# Patient Record
Sex: MALE | Race: WHITE | NOT HISPANIC OR LATINO | Employment: FULL TIME | URBAN - METROPOLITAN AREA
[De-identification: names, ages, dates, MRNs, and addresses within clinical notes are randomized per-mention and may not be internally consistent; named-entity substitution may affect disease eponyms.]

---

## 2017-10-22 ENCOUNTER — HOSPITAL ENCOUNTER (INPATIENT)
Facility: HOSPITAL | Age: 37
LOS: 1 days | Discharge: HOME | DRG: 983 | End: 2017-10-25
Attending: FAMILY MEDICINE | Admitting: ORTHOPAEDIC SURGERY
Payer: MEDICAID

## 2017-10-22 DIAGNOSIS — L03.011 CELLULITIS OF FINGER OF RIGHT HAND: ICD-10-CM

## 2017-10-22 DIAGNOSIS — A49.01 STAPH AUREUS INFECTION: ICD-10-CM

## 2017-10-22 DIAGNOSIS — Z01.811 PRE-OP CHEST EXAM: ICD-10-CM

## 2017-10-22 DIAGNOSIS — L03.019 CELLULITIS OF FINGER, UNSPECIFIED LATERALITY: Primary | ICD-10-CM

## 2017-10-22 LAB
ANION GAP SERPL CALC-SCNC: 9 MMOL/L
BASOPHILS # BLD AUTO: 0.03 K/UL
BASOPHILS NFR BLD: 0.2 %
BUN SERPL-MCNC: 20 MG/DL
CALCIUM SERPL-MCNC: 9.4 MG/DL
CHLORIDE SERPL-SCNC: 104 MMOL/L
CO2 SERPL-SCNC: 28 MMOL/L
CREAT SERPL-MCNC: 1.3 MG/DL
CRP SERPL-MCNC: 43.4 MG/L
DIFFERENTIAL METHOD: ABNORMAL
EOSINOPHIL # BLD AUTO: 0.1 K/UL
EOSINOPHIL NFR BLD: 0.7 %
ERYTHROCYTE [DISTWIDTH] IN BLOOD BY AUTOMATED COUNT: 13.4 %
ERYTHROCYTE [SEDIMENTATION RATE] IN BLOOD BY WESTERGREN METHOD: 12 MM/HR
EST. GFR  (AFRICAN AMERICAN): >60 ML/MIN/1.73 M^2
EST. GFR  (NON AFRICAN AMERICAN): >60 ML/MIN/1.73 M^2
GLUCOSE SERPL-MCNC: 75 MG/DL
HCT VFR BLD AUTO: 40.1 %
HGB BLD-MCNC: 14 G/DL
IMM GRANULOCYTES # BLD AUTO: 0.05 K/UL
IMM GRANULOCYTES NFR BLD AUTO: 0.4 %
LACTATE SERPL-SCNC: 1.5 MMOL/L
LYMPHOCYTES # BLD AUTO: 1.1 K/UL
LYMPHOCYTES NFR BLD: 9.2 %
MCH RBC QN AUTO: 31.3 PG
MCHC RBC AUTO-ENTMCNC: 34.9 G/DL
MCV RBC AUTO: 90 FL
MONOCYTES # BLD AUTO: 0.7 K/UL
MONOCYTES NFR BLD: 5.9 %
NEUTROPHILS # BLD AUTO: 10.2 K/UL
NEUTROPHILS NFR BLD: 83.6 %
NRBC BLD-RTO: 0 /100 WBC
PLATELET # BLD AUTO: 159 K/UL
PMV BLD AUTO: 9.4 FL
POTASSIUM SERPL-SCNC: 4.1 MMOL/L
RBC # BLD AUTO: 4.47 M/UL
SODIUM SERPL-SCNC: 141 MMOL/L
WBC # BLD AUTO: 12.19 K/UL

## 2017-10-22 PROCEDURE — 80048 BASIC METABOLIC PNL TOTAL CA: CPT

## 2017-10-22 PROCEDURE — 63600175 PHARM REV CODE 636 W HCPCS: Performed by: STUDENT IN AN ORGANIZED HEALTH CARE EDUCATION/TRAINING PROGRAM

## 2017-10-22 PROCEDURE — 25000003 PHARM REV CODE 250: Performed by: FAMILY MEDICINE

## 2017-10-22 PROCEDURE — 85651 RBC SED RATE NONAUTOMATED: CPT

## 2017-10-22 PROCEDURE — 11000001 HC ACUTE MED/SURG PRIVATE ROOM

## 2017-10-22 PROCEDURE — 85025 COMPLETE CBC W/AUTO DIFF WBC: CPT

## 2017-10-22 PROCEDURE — 99284 EMERGENCY DEPT VISIT MOD MDM: CPT | Mod: ,,, | Performed by: EMERGENCY MEDICINE

## 2017-10-22 PROCEDURE — 90471 IMMUNIZATION ADMIN: CPT | Performed by: FAMILY MEDICINE

## 2017-10-22 PROCEDURE — 96365 THER/PROPH/DIAG IV INF INIT: CPT

## 2017-10-22 PROCEDURE — 99285 EMERGENCY DEPT VISIT HI MDM: CPT | Mod: 25

## 2017-10-22 PROCEDURE — 63600175 PHARM REV CODE 636 W HCPCS: Performed by: FAMILY MEDICINE

## 2017-10-22 PROCEDURE — 96367 TX/PROPH/DG ADDL SEQ IV INF: CPT

## 2017-10-22 PROCEDURE — 83605 ASSAY OF LACTIC ACID: CPT

## 2017-10-22 PROCEDURE — 96372 THER/PROPH/DIAG INJ SC/IM: CPT

## 2017-10-22 PROCEDURE — 86140 C-REACTIVE PROTEIN: CPT

## 2017-10-22 PROCEDURE — 87040 BLOOD CULTURE FOR BACTERIA: CPT

## 2017-10-22 PROCEDURE — 90715 TDAP VACCINE 7 YRS/> IM: CPT | Performed by: FAMILY MEDICINE

## 2017-10-22 PROCEDURE — 93005 ELECTROCARDIOGRAM TRACING: CPT

## 2017-10-22 RX ORDER — KETOROLAC TROMETHAMINE 30 MG/ML
15 INJECTION, SOLUTION INTRAMUSCULAR; INTRAVENOUS
Status: COMPLETED | OUTPATIENT
Start: 2017-10-22 | End: 2017-10-22

## 2017-10-22 RX ORDER — SULFAMETHOXAZOLE AND TRIMETHOPRIM 800; 160 MG/1; MG/1
1 TABLET ORAL
Status: ON HOLD | COMMUNITY
End: 2017-10-25

## 2017-10-22 RX ORDER — IBUPROFEN 800 MG/1
800 TABLET ORAL EVERY 12 HOURS PRN
COMMUNITY
End: 2017-11-30

## 2017-10-22 RX ORDER — TRAMADOL HYDROCHLORIDE 50 MG/1
50 TABLET ORAL EVERY 6 HOURS PRN
Status: ON HOLD | COMMUNITY
End: 2017-10-25 | Stop reason: HOSPADM

## 2017-10-22 RX ORDER — LIDOCAINE HYDROCHLORIDE 10 MG/ML
10 INJECTION INFILTRATION; PERINEURAL ONCE
Status: COMPLETED | OUTPATIENT
Start: 2017-10-22 | End: 2017-10-22

## 2017-10-22 RX ADMIN — CLOSTRIDIUM TETANI TOXOID ANTIGEN (FORMALDEHYDE INACTIVATED), CORYNEBACTERIUM DIPHTHERIAE TOXOID ANTIGEN (FORMALDEHYDE INACTIVATED), BORDETELLA PERTUSSIS TOXOID ANTIGEN (GLUTARALDEHYDE INACTIVATED), BORDETELLA PERTUSSIS FILAMENTOUS HEMAGGLUTININ ANTIGEN (FORMALDEHYDE INACTIVATED), BORDETELLA PERTUSSIS PERTACTIN ANTIGEN, AND BORDETELLA PERTUSSIS FIMBRIAE 2/3 ANTIGEN 0.5 ML: 5; 2; 2.5; 5; 3; 5 INJECTION, SUSPENSION INTRAMUSCULAR at 04:10

## 2017-10-22 RX ADMIN — KETOROLAC TROMETHAMINE 15 MG: 30 INJECTION, SOLUTION INTRAMUSCULAR at 06:10

## 2017-10-22 RX ADMIN — LIDOCAINE HYDROCHLORIDE 10 ML: 10 INJECTION, SOLUTION INFILTRATION; PERINEURAL at 05:10

## 2017-10-22 NOTE — ED TRIAGE NOTES
Redness, pain and swelling to his right hand since yesterday.  States that he think something bit him while he was cleaning out a shed.  [facility] and was prescribed Bactrim, Ibuprofen and Ultram.    GENERAL: The patient is well-developed and well-nourished in no apparent distress. Alert and oriented x4.                                                HEENT: Head is normocephalic and atraumatic. Extraocular muscles are intact. Pupils are equal, round, and reactive to light and accommodation. Nares appeared normal. Mouth is well hydrated and without lesions. Mucous membranes are moist. Posterior pharynx clear of any exudate or lesions.    NECK: Supple. No carotid bruits. No lymphadenopathy or thyromegaly.    LUNGS: Clear to auscultation.    HEART: Regular rate and rhythm without murmur.     ABDOMEN: Soft, nontender, and nondistended. Positive bowel sounds. No hepatosplenomegaly was noted.     EXTREMITIES: Without any cyanosis, clubbing, rash.  Has a lesion to his right middle finger and edema in his right hand.     NEUROLOGIC: Cranial nerves II through XII are grossly intact.     PSYCHIATRIC: Flat affect, but denies suicidal or homicidal ideations.    SKIN: No ulceration or induration present.

## 2017-10-23 ENCOUNTER — ANESTHESIA EVENT (OUTPATIENT)
Dept: SURGERY | Facility: HOSPITAL | Age: 37
DRG: 983 | End: 2017-10-23
Payer: MEDICAID

## 2017-10-23 PROBLEM — L03.019 CELLULITIS OF FINGER: Status: ACTIVE | Noted: 2017-10-23

## 2017-10-23 LAB — VANCOMYCIN TROUGH SERPL-MCNC: 4 UG/ML

## 2017-10-23 PROCEDURE — 80202 ASSAY OF VANCOMYCIN: CPT

## 2017-10-23 PROCEDURE — 63600175 PHARM REV CODE 636 W HCPCS: Performed by: PHYSICIAN ASSISTANT

## 2017-10-23 PROCEDURE — 25000003 PHARM REV CODE 250: Performed by: ORTHOPAEDIC SURGERY

## 2017-10-23 PROCEDURE — G0378 HOSPITAL OBSERVATION PER HR: HCPCS

## 2017-10-23 PROCEDURE — A9585 GADOBUTROL INJECTION: HCPCS | Performed by: ORTHOPAEDIC SURGERY

## 2017-10-23 PROCEDURE — 63600175 PHARM REV CODE 636 W HCPCS: Performed by: ORTHOPAEDIC SURGERY

## 2017-10-23 PROCEDURE — 25000003 PHARM REV CODE 250: Performed by: STUDENT IN AN ORGANIZED HEALTH CARE EDUCATION/TRAINING PROGRAM

## 2017-10-23 PROCEDURE — 93010 ELECTROCARDIOGRAM REPORT: CPT | Mod: ,,, | Performed by: INTERNAL MEDICINE

## 2017-10-23 PROCEDURE — A4216 STERILE WATER/SALINE, 10 ML: HCPCS | Performed by: ORTHOPAEDIC SURGERY

## 2017-10-23 PROCEDURE — 36415 COLL VENOUS BLD VENIPUNCTURE: CPT

## 2017-10-23 PROCEDURE — 25500020 PHARM REV CODE 255: Performed by: ORTHOPAEDIC SURGERY

## 2017-10-23 PROCEDURE — 11000001 HC ACUTE MED/SURG PRIVATE ROOM

## 2017-10-23 PROCEDURE — 25000003 PHARM REV CODE 250: Performed by: PHYSICIAN ASSISTANT

## 2017-10-23 RX ORDER — CELECOXIB 100 MG/1
200 CAPSULE ORAL NIGHTLY
Status: DISCONTINUED | OUTPATIENT
Start: 2017-10-24 | End: 2017-10-25 | Stop reason: HOSPADM

## 2017-10-23 RX ORDER — HYDROMORPHONE HYDROCHLORIDE 1 MG/ML
0.5 INJECTION, SOLUTION INTRAMUSCULAR; INTRAVENOUS; SUBCUTANEOUS ONCE
Status: COMPLETED | OUTPATIENT
Start: 2017-10-23 | End: 2017-10-23

## 2017-10-23 RX ORDER — HYDROCODONE BITARTRATE AND ACETAMINOPHEN 5; 325 MG/1; MG/1
1 TABLET ORAL EVERY 4 HOURS PRN
Status: DISCONTINUED | OUTPATIENT
Start: 2017-10-23 | End: 2017-10-23

## 2017-10-23 RX ORDER — OXYCODONE HYDROCHLORIDE 5 MG/1
5 TABLET ORAL
Status: DISCONTINUED | OUTPATIENT
Start: 2017-10-23 | End: 2017-10-25 | Stop reason: HOSPADM

## 2017-10-23 RX ORDER — SODIUM CHLORIDE 9 MG/ML
INJECTION, SOLUTION INTRAVENOUS CONTINUOUS
Status: ACTIVE | OUTPATIENT
Start: 2017-10-23 | End: 2017-10-23

## 2017-10-23 RX ORDER — OXYCODONE HYDROCHLORIDE 5 MG/1
10 TABLET ORAL
Status: DISCONTINUED | OUTPATIENT
Start: 2017-10-23 | End: 2017-10-25 | Stop reason: HOSPADM

## 2017-10-23 RX ORDER — PREGABALIN 75 MG/1
75 CAPSULE ORAL NIGHTLY
Status: DISCONTINUED | OUTPATIENT
Start: 2017-10-23 | End: 2017-10-25 | Stop reason: HOSPADM

## 2017-10-23 RX ORDER — RAMELTEON 8 MG/1
8 TABLET ORAL NIGHTLY PRN
Status: DISCONTINUED | OUTPATIENT
Start: 2017-10-23 | End: 2017-10-25 | Stop reason: HOSPADM

## 2017-10-23 RX ORDER — ACETAMINOPHEN 10 MG/ML
1000 INJECTION, SOLUTION INTRAVENOUS EVERY 6 HOURS
Status: DISPENSED | OUTPATIENT
Start: 2017-10-24 | End: 2017-10-24

## 2017-10-23 RX ORDER — ACETAMINOPHEN 500 MG
1000 TABLET ORAL EVERY 6 HOURS
Status: DISCONTINUED | OUTPATIENT
Start: 2017-10-25 | End: 2017-10-25 | Stop reason: HOSPADM

## 2017-10-23 RX ORDER — DIPHENHYDRAMINE HYDROCHLORIDE 50 MG/ML
25 INJECTION INTRAMUSCULAR; INTRAVENOUS EVERY 4 HOURS PRN
Status: DISCONTINUED | OUTPATIENT
Start: 2017-10-23 | End: 2017-10-25 | Stop reason: HOSPADM

## 2017-10-23 RX ORDER — ACETAMINOPHEN 325 MG/1
650 TABLET ORAL EVERY 8 HOURS PRN
Status: DISCONTINUED | OUTPATIENT
Start: 2017-10-23 | End: 2017-10-23

## 2017-10-23 RX ORDER — HYDROCODONE BITARTRATE AND ACETAMINOPHEN 5; 325 MG/1; MG/1
1 TABLET ORAL
Status: COMPLETED | OUTPATIENT
Start: 2017-10-23 | End: 2017-10-23

## 2017-10-23 RX ORDER — SODIUM CHLORIDE 0.9 % (FLUSH) 0.9 %
3 SYRINGE (ML) INJECTION EVERY 8 HOURS
Status: DISCONTINUED | OUTPATIENT
Start: 2017-10-23 | End: 2017-10-25 | Stop reason: HOSPADM

## 2017-10-23 RX ORDER — HYDROCODONE BITARTRATE AND ACETAMINOPHEN 10; 325 MG/1; MG/1
1 TABLET ORAL EVERY 4 HOURS PRN
Status: DISCONTINUED | OUTPATIENT
Start: 2017-10-23 | End: 2017-10-23

## 2017-10-23 RX ORDER — ONDANSETRON 8 MG/1
8 TABLET, ORALLY DISINTEGRATING ORAL EVERY 8 HOURS PRN
Status: DISCONTINUED | OUTPATIENT
Start: 2017-10-23 | End: 2017-10-25 | Stop reason: HOSPADM

## 2017-10-23 RX ORDER — CELECOXIB 200 MG/1
400 CAPSULE ORAL ONCE
Status: COMPLETED | OUTPATIENT
Start: 2017-10-23 | End: 2017-10-23

## 2017-10-23 RX ORDER — OXYCODONE HYDROCHLORIDE 5 MG/1
15 TABLET ORAL
Status: DISCONTINUED | OUTPATIENT
Start: 2017-10-23 | End: 2017-10-25 | Stop reason: HOSPADM

## 2017-10-23 RX ORDER — GADOBUTROL 604.72 MG/ML
8 INJECTION INTRAVENOUS
Status: COMPLETED | OUTPATIENT
Start: 2017-10-23 | End: 2017-10-23

## 2017-10-23 RX ORDER — HYDROMORPHONE HYDROCHLORIDE 1 MG/ML
0.5 INJECTION, SOLUTION INTRAMUSCULAR; INTRAVENOUS; SUBCUTANEOUS EVERY 4 HOURS PRN
Status: DISCONTINUED | OUTPATIENT
Start: 2017-10-23 | End: 2017-10-25 | Stop reason: HOSPADM

## 2017-10-23 RX ADMIN — HYDROCODONE BITARTRATE AND ACETAMINOPHEN 1 TABLET: 5; 325 TABLET ORAL at 01:10

## 2017-10-23 RX ADMIN — HYDROCODONE BITARTRATE AND ACETAMINOPHEN 1 TABLET: 10; 325 TABLET ORAL at 02:10

## 2017-10-23 RX ADMIN — SODIUM CHLORIDE: 0.9 INJECTION, SOLUTION INTRAVENOUS at 01:10

## 2017-10-23 RX ADMIN — VANCOMYCIN HYDROCHLORIDE 1500 MG: 10 INJECTION, POWDER, LYOPHILIZED, FOR SOLUTION INTRAVENOUS at 02:10

## 2017-10-23 RX ADMIN — ONDANSETRON 8 MG: 8 TABLET, ORALLY DISINTEGRATING ORAL at 08:10

## 2017-10-23 RX ADMIN — VANCOMYCIN HYDROCHLORIDE 1000 MG: 1 INJECTION, POWDER, LYOPHILIZED, FOR SOLUTION INTRAVENOUS at 01:10

## 2017-10-23 RX ADMIN — CEFTRIAXONE 2 G: 2 INJECTION, SOLUTION INTRAVENOUS at 12:10

## 2017-10-23 RX ADMIN — HYDROMORPHONE HYDROCHLORIDE 0.5 MG: 1 INJECTION, SOLUTION INTRAMUSCULAR; INTRAVENOUS; SUBCUTANEOUS at 03:10

## 2017-10-23 RX ADMIN — HYDROMORPHONE HYDROCHLORIDE 0.5 MG: 1 INJECTION, SOLUTION INTRAMUSCULAR; INTRAVENOUS; SUBCUTANEOUS at 09:10

## 2017-10-23 RX ADMIN — HYDROCODONE BITARTRATE AND ACETAMINOPHEN 1 TABLET: 10; 325 TABLET ORAL at 07:10

## 2017-10-23 RX ADMIN — Medication 3 ML: at 04:10

## 2017-10-23 RX ADMIN — GADOBUTROL 8 ML: 604.72 INJECTION INTRAVENOUS at 09:10

## 2017-10-23 RX ADMIN — HYDROMORPHONE HYDROCHLORIDE 0.5 MG: 1 INJECTION, SOLUTION INTRAMUSCULAR; INTRAVENOUS; SUBCUTANEOUS at 08:10

## 2017-10-23 RX ADMIN — CELECOXIB 400 MG: 200 CAPSULE ORAL at 08:10

## 2017-10-23 RX ADMIN — CEFTRIAXONE 2 G: 2 INJECTION, SOLUTION INTRAVENOUS at 04:10

## 2017-10-23 RX ADMIN — PREGABALIN 75 MG: 75 CAPSULE ORAL at 08:10

## 2017-10-23 RX ADMIN — HYDROCODONE BITARTRATE AND ACETAMINOPHEN 1 TABLET: 10; 325 TABLET ORAL at 10:10

## 2017-10-23 RX ADMIN — HYDROCODONE BITARTRATE AND ACETAMINOPHEN 1 TABLET: 10; 325 TABLET ORAL at 06:10

## 2017-10-23 NOTE — ED NOTES
Pt reassessed. PA at bedside. Area of redness on rt hand marked above the wrist. ROM intact. Warmth, redness, and edema noted. Pt reports numbness to area.

## 2017-10-23 NOTE — PROGRESS NOTES
Orthopaedic Surgery  Progress Note    Subjective:  NAEO  Pain controlled  Elevated overnight  Abx in place - vanc / rocephin    Objective:  Temp:  [96.3 °F (35.7 °C)-98.5 °F (36.9 °C)] 96.3 °F (35.7 °C)  Pulse:  [69-94] 69  Resp:  [16-18] 16  SpO2:  [97 %-100 %] 100 %  BP: (108-128)/(61-69) 108/62    PE:    AA&O x 4.  NAD  HEENT:  NCAT, sclera nonicteric  Lungs:  Respirations are equal and unlabored.  CV:  2+ bilateral upper and lower extremity pulses.  Skin:  Intact throughout.    MSK:    RUE:  Skin intact  Moderate erythema and swelling of the dorsal right middle finger, centered around proximal phalanx, extending to dorsum of hand  No palpable fluctuance or drainage  Sensation intact in M/U/R nerve distributions, decreased sensation in fingertips  Wrist ROM intact  Motor intact AIN/PIN/U/R nerves  TTP around finger dorsally, no volar tenderness  2+ DR pulse      A/P: 35yo male with right long finger cellulitis    NPO  NWB to RUE  MRI of right hand    Dispo: No palpable fluctuance, will obtain MRI to rule out surgical intervention. Continue elevation and abx.    Adal De Luna MD  Orthopaedic Surgery Resident  [facility]

## 2017-10-23 NOTE — HPI
Ruddy Mann III is a 36 y.o. male who presents with right dorsal long finger cellulitis and possible abscess formation. [facility] and was given IV-abx and discharged home. Patient was also prescribed bactrim. He presents today with worsening pain and loss in ROM. Denies any volar pain, pain with passive extension or pain along the flexor tendon sheath.

## 2017-10-23 NOTE — ASSESSMENT & PLAN NOTE
Ruddy Mann III is a 36 y.o. male with cellulitis of right finger  - Admit to orthopedics for IV abx  - Ice and elevation of extremity  - NPO at midnight for possible washout in OR tomorrow  - Pain control

## 2017-10-23 NOTE — ANESTHESIA PREPROCEDURE EVALUATION
Pre-operative evaluation for Procedure(s) (LRB):  IRRIGATION AND DEBRIDEMENT-EXTRMITY-UPPER-HAND TABLE FOR STRETCHER-HAND SET - 6L NS W/ CYSTO (Right)    Ruddy Mann III is a 36 y.o. male with no major PMH who presented with cellulitis and abscess of right middle finger. He presents for the above procedure.    LDA:   R AC 18G    Prev airway: none on file    Drips:  none      Patient Active Problem List   Diagnosis    Cellulitis of finger       Review of patient's allergies indicates:  No Known Allergies     No current facility-administered medications on file prior to encounter.      No current outpatient prescriptions on file prior to encounter.       History reviewed. No pertinent surgical history.    Social History     Social History    Marital status:      Spouse name: N/A    Number of children: N/A    Years of education: N/A     Occupational History    Not on file.     Social History Main Topics    Smoking status: Never Smoker    Smokeless tobacco: Never Used    Alcohol use No    Drug use: No    Sexual activity: Yes     Partners: Female     Birth control/ protection: None     Other Topics Concern    Not on file     Social History Narrative    No narrative on file         Vital Signs Range (Last 24H):  Temp:  [35.7 °C (96.3 °F)-36.9 °C (98.5 °F)]   Pulse:  [66-89]   Resp:  [16-18]   BP: (108-128)/(59-69)   SpO2:  [94 %-100 %]       CBC:   Recent Labs      10/22/17   1757   WBC  12.19   RBC  4.47*   HGB  14.0   HCT  40.1   PLT  159   MCV  90   MCH  31.3*   MCHC  34.9       CMP:   Recent Labs      10/22/17   1757   NA  141   K  4.1   CL  104   CO2  28   BUN  20   CREATININE  1.3   GLU  75   CALCIUM  9.4       INR  No results for input(s): INR, PROTIME, APTT in the last 72 hours.    Invalid input(s): PT        Diagnostic Studies: 10/23/17 MRI Upper extremity  Cellulitis as above most prevalent at the proximal 3rd digit with focal serpiginous area of non-enhancement suspected to represent  early abscess/phlegmonous change versus tissue necrosis.      No well-defined drainable fluid collection.  No evidence of osteomyelitis.    EKG: 10/23/17  Normal sinus rhythm  Normal ECG  No previous ECGs available    2D Echo: none on file          Anesthesia Evaluation    I have reviewed the Patient Summary Reports.    I have reviewed the Nursing Notes.   I have reviewed the Medications.     Review of Systems  Anesthesia Hx:  No problems with previous Anesthesia  Denies Family Hx of Anesthesia complications.   Denies Personal Hx of Anesthesia complications.   Social:  No Alcohol Use, Non-Smoker    Hematology/Oncology:     Oncology Normal     EENT/Dental:EENT/Dental Normal   Cardiovascular:  Cardiovascular Normal Exercise tolerance: good  ECG has been reviewed.    Pulmonary:  Pulmonary Normal    Renal/:  Renal/ Normal     Hepatic/GI:  Hepatic/GI Normal    Musculoskeletal:  Musculoskeletal Normal    Neurological:  Neurology Normal    Endocrine:  Endocrine Normal    Dermatological:   Right finger cellulitis   Psych:   anxiety ADHD         Physical Exam  General:  Well nourished    Airway/Jaw/Neck:  Airway Findings: Mouth Opening: Normal Tongue: Normal  General Airway Assessment: Adult, Good  Mallampati: II  TM Distance: Normal, at least 6 cm  Jaw/Neck Findings:  Neck ROM: Normal ROM      Dental:  Dental Findings: In tact   Chest/Lungs:  Chest/Lungs Clear    Heart/Vascular:  Heart Findings: Normal Heart murmur: negative    Abdomen:  Abdomen Findings: Normal    Musculoskeletal:  Musculoskeletal Findings: Normal   Skin:  Skin Findings:  Erythema     Mental Status:  Mental Status Findings:  Cooperative, Alert and Oriented, Anxious         Anesthesia Plan  Type of Anesthesia, risks & benefits discussed:  Anesthesia Type:  general, MAC, regional  Patient's Preference:   Intra-op Monitoring Plan: standard ASA monitors  Intra-op Monitoring Plan Comments:   Post Op Pain Control Plan: IV/PO Opioids PRN, per primary service  following discharge from PACU and multimodal analgesia  Post Op Pain Control Plan Comments:   Induction:   IV  Beta Blocker:  Patient is not currently on a Beta-Blocker (No further documentation required).       Informed Consent: Patient understands risks and agrees with Anesthesia plan.  Questions answered. Anesthesia consent signed with patient.  ASA Score: 2     Day of Surgery Review of History & Physical:  There are no significant changes.          Ready For Surgery From Anesthesia Perspective.

## 2017-10-23 NOTE — PROVIDER PROGRESS NOTES - EMERGENCY DEPT.
Encounter Date: 10/22/2017    ED Physician Progress Notes        Physician Note:   At this time.  Patient is not return for orthopedic evaluation and admission.  He is been seen by Dr. Chou and admission was discussed.  .  If he does return later this evening,  orthopedics should be consult for admission for planned parenteral antibiotics and possible surgical wash out of hand cellulitis    RICH

## 2017-10-23 NOTE — SUBJECTIVE & OBJECTIVE
History reviewed. No pertinent past medical history.    History reviewed. No pertinent surgical history.    Review of patient's allergies indicates:  No Known Allergies    Current Facility-Administered Medications   Medication    0.9%  NaCl infusion    acetaminophen tablet 650 mg    cefTRIAXone (ROCEPHIN) 2 g in dextrose 5 % 50 mL IVPB    diphenhydrAMINE injection 25 mg    hydrocodone-acetaminophen 10-325mg per tablet 1 tablet    hydrocodone-acetaminophen 5-325mg per tablet 1 tablet    ondansetron disintegrating tablet 8 mg    promethazine (PHENERGAN) 6.25 mg in dextrose 5 % 50 mL IVPB    ramelteon tablet 8 mg    sodium chloride 0.9% flush 3 mL    vancomycin (VANCOCIN) 1,500 mg in dextrose 5 % 250 mL IVPB    vancomycin 1 g in dextrose 5 % 250 mL IVPB (ready to mix system)     Current Outpatient Prescriptions   Medication Sig    ibuprofen (ADVIL,MOTRIN) 800 MG tablet Take 800 mg by mouth every 12 (twelve) hours as needed for Pain.    sulfamethoxazole-trimethoprim 800-160mg (BACTRIM DS) 800-160 mg Tab Take 1 tablet by mouth every 12 (twelve) hours.    tramadol (ULTRAM) 50 mg tablet Take 50 mg by mouth every 6 (six) hours as needed for Pain. 1-2 tablets     Family History     Problem Relation (Age of Onset)    No Known Problems Mother, Father        Social History Main Topics    Smoking status: Never Smoker    Smokeless tobacco: Never Used    Alcohol use No    Drug use: No    Sexual activity: Yes     Partners: Female     Birth control/ protection: None     ROS   Constitution: Negative for chills and fever.   HENT: Negative for congestion and headaches.    Eyes: Negative for photophobia and redness.   Respiratory: Negative for cough and shortness of breath.    Skin: Positive for erythema, negative for rash.   Musculoskeletal: Positive for joint pain.   Gastrointestinal: Negative for abdominal pain, nausea and vomiting.   Genitourinary: Negative for flank pain and frequency.   Neurological: Negative  "for dizziness and light-headedness.   Psychiatric/Behavioral: Negative for altered mental status and depression.     Objective:     Vital Signs (Most Recent):  Temp: 98.5 °F (36.9 °C) (10/23/17 0050)  Pulse: 82 (10/23/17 0101)  Resp: 16 (10/23/17 0050)  BP: 126/69 (10/23/17 0101)  SpO2: 100 % (10/23/17 0101) Vital Signs (24h Range):  Temp:  [98.2 °F (36.8 °C)-98.5 °F (36.9 °C)] 98.5 °F (36.9 °C)  Pulse:  [82-94] 82  Resp:  [16-18] 16  SpO2:  [97 %-100 %] 100 %  BP: (121-128)/(61-69) 126/69     Weight: 72.6 kg (160 lb)  Height: 5' 11" (180.3 cm)  Body mass index is 22.32 kg/m².      Intake/Output Summary (Last 24 hours) at 10/23/17 0137  Last data filed at 10/23/17 0131   Gross per 24 hour   Intake               50 ml   Output                0 ml   Net               50 ml       Ortho/SPM Exam   HEENT: normocephalic, atraumatic  Resp: no increased work of breathing  CV: regular rate and rhythm  MSK: moves all extremities well    right upper extremity:  Skin intact  Moderate erythema and swelling of the dorsal right middle finger, centered around proximal phalanx, extending to dorsum of hand  No palpable fluctuance or drainage  Sensation intact in M/U/R nerve distributions, decreased sensation in fingertips  Wrist ROM intact  Motor intact AIN/PIN/U/R nerves  TTP around finger dorsally, no volar tenderness  2+ DR pulse    Significant Labs:   BMP:   Recent Labs  Lab 10/22/17  1757   GLU 75      K 4.1      CO2 28   BUN 20   CREATININE 1.3   CALCIUM 9.4     CBC:   Recent Labs  Lab 10/22/17  1757   WBC 12.19   HGB 14.0   HCT 40.1          Recent Labs  Lab 10/22/17  1757   WBC 12.19   SEDRATE 12*   CRP 43.4*      Significant Imaging: MRI pending; no fractures or dislocations on radiographs  "

## 2017-10-23 NOTE — NURSING
Spreading of redness was noted to extend past wrist. I marked the redness and wrote the time beside the redness. I spoke with Dr. De Luna and let him know that the redness had spread and patient complains of increasing pain. The redness and swelling does seem to be worse around the original would area. Patient instructed to keep hand elevated. Dr. De Luna stated that he would be by later today to talk to patient about procedure tomorrow and to get consents. I told Dr. De Luna that patient was also asking about several medications that were not listed on home med list. Patient does not know dosage of medication.

## 2017-10-23 NOTE — PLAN OF CARE
Patient admitted with right finger cellulitis and abscess. Possible I&D today. Patient currently lives alone but has family for support. CM completed discharge assessment and planning with patient. Patient verbalized understanding. All questions and concerns addressed. SW and CM will continue to follow for any additional needs. Plan A to discharge home as soon as medically stable. Plan B to discharge home with home health.    PCP: Trevor Osorio Shazia    Pharmacy:   [facility] [address]  [address] [address]  Phone: [phone] Fax: [phone]    Payor: MEDICAID / Plan: [place] / Product Type: Managed Medicaid /      10/23/17 0920   Discharge Assessment   Assessment Type Discharge Planning Assessment   Confirmed/corrected address and phone number on facesheet? Yes   Assessment information obtained from? Patient;Medical Record   Expected Length of Stay (days) 2   Communicated expected length of stay with patient/caregiver yes   Prior to hospitilization cognitive status: Alert/Oriented   Prior to hospitalization functional status: Independent   Current cognitive status: Alert/Oriented   Current Functional Status: Independent   Lives With alone   Able to Return to Prior Arrangements yes   Is patient able to care for self after discharge? Yes   Who are your caregiver(s) and their phone number(s)? grand parent Clemente Mann [phone], [phone]   Patient's perception of discharge disposition home or selfcare   Readmission Within The Last 30 Days no previous admission in last 30 days   Patient currently being followed by outpatient case management? No   Patient currently receives any other outside agency services? No   Equipment Currently Used at Home none   Do you have any problems affording any of your prescribed medications? No   Is the patient taking medications as prescribed? yes   Does the patient have  transportation home? Yes   Transportation Available family or friend will provide   Does the patient receive services at the [facility]? No   Discharge Plan A Home   Discharge Plan B Home Health;Home   Patient/Family In Agreement With Plan yes

## 2017-10-23 NOTE — PLAN OF CARE
Problem: Patient Care Overview  Goal: Plan of Care Review  Outcome: Ongoing (interventions implemented as appropriate)  Pt is AAO x3.  VSS.  No injury/falls this shift.  Pt calls for assistance when needed and is indep with mobility.  Pain being monitored and controlled via PRN meds.  No s/s of infection noted.  No s/s of skin breakdown noted.  Bed lowest position, call light within reach, siderails up x2.

## 2017-10-23 NOTE — H&P
"[facility]  Orthopedics  H&P    Patient Name: Ruddy Mann III  MRN: [id number]  Admission Date: 10/22/2017  Primary Care Provider: Trevor Of Shazia    Patient information was obtained from patient and ER records.     Subjective:     Principal Problem:Cellulitis of finger    Chief Complaint:   Chief Complaint   Patient presents with    Cellulitis     right middle finger cellulitis. right thigh abscess. [facility]. took 2 bactrim so far. getting worse and "going passed the line they leon"        HPI: Ruddy Mann III is a 36 y.o. male who presents with right dorsal long finger cellulitis and possible abscess formation. Patient went to [facility] yesterday and was given IV-abx and discharged home. Patient was also prescribed bactrim. He presents today with worsening pain and loss in ROM. Denies any volar pain, pain with passive extension or pain along the flexor tendon sheath.    History reviewed. No pertinent past medical history.    History reviewed. No pertinent surgical history.    Review of patient's allergies indicates:  No Known Allergies    Current Facility-Administered Medications   Medication    0.9%  NaCl infusion    acetaminophen tablet 650 mg    cefTRIAXone (ROCEPHIN) 2 g in dextrose 5 % 50 mL IVPB    diphenhydrAMINE injection 25 mg    hydrocodone-acetaminophen 10-325mg per tablet 1 tablet    hydrocodone-acetaminophen 5-325mg per tablet 1 tablet    ondansetron disintegrating tablet 8 mg    promethazine (PHENERGAN) 6.25 mg in dextrose 5 % 50 mL IVPB    ramelteon tablet 8 mg    sodium chloride 0.9% flush 3 mL    vancomycin (VANCOCIN) 1,500 mg in dextrose 5 % 250 mL IVPB    vancomycin 1 g in dextrose 5 % 250 mL IVPB (ready to mix system)     Current Outpatient Prescriptions   Medication Sig    ibuprofen (ADVIL,MOTRIN) 800 MG tablet Take 800 mg by mouth every 12 (twelve) hours as needed for Pain.    sulfamethoxazole-trimethoprim 800-160mg (BACTRIM DS) " "800-160 mg Tab Take 1 tablet by mouth every 12 (twelve) hours.    tramadol (ULTRAM) 50 mg tablet Take 50 mg by mouth every 6 (six) hours as needed for Pain. 1-2 tablets     Family History     Problem Relation (Age of Onset)    No Known Problems Mother, Father        Social History Main Topics    Smoking status: Never Smoker    Smokeless tobacco: Never Used    Alcohol use No    Drug use: No    Sexual activity: Yes     Partners: Female     Birth control/ protection: None     ROS   Constitution: Negative for chills and fever.   HENT: Negative for congestion and headaches.    Eyes: Negative for photophobia and redness.   Respiratory: Negative for cough and shortness of breath.    Skin: Positive for erythema, negative for rash.   Musculoskeletal: Positive for joint pain.   Gastrointestinal: Negative for abdominal pain, nausea and vomiting.   Genitourinary: Negative for flank pain and frequency.   Neurological: Negative for dizziness and light-headedness.   Psychiatric/Behavioral: Negative for altered mental status and depression.     Objective:     Vital Signs (Most Recent):  Temp: 98.5 °F (36.9 °C) (10/23/17 0050)  Pulse: 82 (10/23/17 0101)  Resp: 16 (10/23/17 0050)  BP: 126/69 (10/23/17 0101)  SpO2: 100 % (10/23/17 0101) Vital Signs (24h Range):  Temp:  [98.2 °F (36.8 °C)-98.5 °F (36.9 °C)] 98.5 °F (36.9 °C)  Pulse:  [82-94] 82  Resp:  [16-18] 16  SpO2:  [97 %-100 %] 100 %  BP: (121-128)/(61-69) 126/69     Weight: 72.6 kg (160 lb)  Height: 5' 11" (180.3 cm)  Body mass index is 22.32 kg/m².      Intake/Output Summary (Last 24 hours) at 10/23/17 0137  Last data filed at 10/23/17 0131   Gross per 24 hour   Intake               50 ml   Output                0 ml   Net               50 ml       Ortho/SPM Exam   HEENT: normocephalic, atraumatic  Resp: no increased work of breathing  CV: regular rate and rhythm  MSK: moves all extremities well    right upper extremity:  Skin intact  Moderate erythema and swelling of the " dorsal right middle finger, centered around proximal phalanx, extending to dorsum of hand  No palpable fluctuance or drainage  Sensation intact in M/U/R nerve distributions, decreased sensation in fingertips  Wrist ROM intact  Motor intact AIN/PIN/U/R nerves  TTP around finger dorsally, no volar tenderness  2+ DR pulse    Significant Labs:   BMP:   Recent Labs  Lab 10/22/17  1757   GLU 75      K 4.1      CO2 28   BUN 20   CREATININE 1.3   CALCIUM 9.4     CBC:   Recent Labs  Lab 10/22/17  1757   WBC 12.19   HGB 14.0   HCT 40.1          Recent Labs  Lab 10/22/17  1757   WBC 12.19   SEDRATE 12*   CRP 43.4*      Significant Imaging: MRI pending; no fractures or dislocations on radiographs    Assessment/Plan:     * Cellulitis of finger    Ruddy Mann III is a 36 y.o. male with cellulitis of right middle finger  - Admit to orthopedics for IV abx  - Ice and elevation of extremity  - NPO at midnight for possible washout in OR tomorrow  - Pain control            Lauren Tierney MD  Orthopedics  [facility]      I have personally examined the patient and agree with the above plan.

## 2017-10-23 NOTE — ED PROVIDER NOTES
"Encounter Date: 10/22/2017       History     Chief Complaint   Patient presents with    Cellulitis     right middle finger cellulitis. right thigh abscess. seen at [facility] last night. took 2 bactrim so far. getting worse and "going passed the line they leon"     37yo male with presents with right dorsal long finger cellulitis and possible abscess formation. Patient went to [facility] yesterday and was given IV-abx and discharged home. Patient was also prescribed bactrim. Presents today with worsening pain and loss in ROM. Denies any volar pain, pain with passive extension or pain along the flexor tendon sheath.          Review of patient's allergies indicates:  No Known Allergies  History reviewed. No pertinent past medical history.  History reviewed. No pertinent surgical history.  Family History   Problem Relation Age of Onset    No Known Problems Mother     No Known Problems Father      Social History   Substance Use Topics    Smoking status: Never Smoker    Smokeless tobacco: Never Used    Alcohol use No     Review of Systems   Constitutional: Negative for chills, diaphoresis, fatigue and fever.   Eyes: Negative for visual disturbance.   Respiratory: Negative for shortness of breath.    Cardiovascular: Negative for chest pain, palpitations and leg swelling.   Gastrointestinal: Negative for abdominal distention and abdominal pain.   Musculoskeletal: Positive for arthralgias and joint swelling.   Skin: Positive for wound.   Neurological: Negative for dizziness and weakness.   Psychiatric/Behavioral: Negative for agitation, behavioral problems and confusion.       Physical Exam     Initial Vitals [10/22/17 1543]   BP Pulse Resp Temp SpO2   121/61 94 18 98.2 °F (36.8 °C) 97 %      MAP       81         Physical Exam    Nursing note and vitals reviewed.  Constitutional: He appears well-developed and well-nourished. He is not diaphoretic. No distress.   Cardiovascular: Normal rate, regular rhythm and normal heart " sounds.   Pulmonary/Chest: Breath sounds normal. He has no wheezes.   Abdominal: Soft. Bowel sounds are normal.   Musculoskeletal:        Right hand: He exhibits decreased range of motion, tenderness and swelling. Normal sensation noted. Normal strength noted.        Hands:  Neurological: He is alert and oriented to person, place, and time. He has normal strength.   Skin: Skin is warm and dry. Capillary refill takes less than 2 seconds.   Psychiatric: He has a normal mood and affect. Thought content normal.         ED Course   Procedures  Labs Reviewed   C-REACTIVE PROTEIN - Abnormal; Notable for the following:        Result Value    CRP 43.4 (*)     All other components within normal limits   SEDIMENTATION RATE, MANUAL - Abnormal; Notable for the following:     Sed Rate 12 (*)     All other components within normal limits   CBC W/ AUTO DIFFERENTIAL - Abnormal; Notable for the following:     RBC 4.47 (*)     MCH 31.3 (*)     Gran # 10.2 (*)     Immature Grans (Abs) 0.05 (*)     Gran% 83.6 (*)     Lymph% 9.2 (*)     All other components within normal limits   CULTURE, BLOOD   CULTURE, BLOOD   BASIC METABOLIC PANEL   LACTIC ACID, PLASMA        Imaging Results          X-Ray Hand 3 view Right (Final result)  Result time 10/22/17 17:31:32    Final result by Dominic Hernandez MD (10/22/17 17:31:32)                 Impression:      1.  No acute displaced fracture or dislocation of the hand, noting diffuse edema about the hand, particularly the dorsal aspect, and the third digit as well.  Correlation recommended.      Electronically signed by: DOMINIC HERNANDEZ MD  Date:     10/22/17  Time:    17:31              Narrative:    Hand complete review    Clinical history: Injury    Comparison: None    Findings:  3 views.    No convincing acute displaced fracture or dislocation of the hand noting diffuse edema about the hand, particularly the dorsal aspect.                                 Medical Decision Making:   Initial  Assessment:   37yo male with right hand cellulitis of the dorsal right long finger.  Differential Diagnosis:   Differential diagnosis includes but not limited to cellulitis, abscess, bullae formation, sporotrichosis  Clinical Tests:   Lab Tests: Ordered  Radiological Study: Ordered  ED Management:  Patient with significant cellulitis to the right hand that will likely require admission with IV-abx. Labs ordered and imaging ordered. Pending results will update care and management plan.       APC / Resident Notes:   1:04 AM I assumed the care of this patient from  at 9:17PM. The patient left the ED to bring his daughter home and returned now. He reports trimming a a tree in his backyard last week when he sustained multiple abrasions from thorns on the tree. He developed pain, swelling, and erythema of the right 3rd digit. The symptoms worsened and extend proximally to the dorsal hand and wrist. He was seen at an OSH yesterday and received a dose of IV antibiotics, discharged home on Bactrim (3 doses). He has FROM of the wrist. ROM and distal sensation of the fingers are decreased throughout, limited by swelling. Cap refill is brisk. No wound drainage noted. He has failed outpatient treatment of cellulitis. I discussed this case with orthopedics. Patient will be admitted for further management. I discussed the care of this patient with my supervising MD.   Regina Kelsey PA-C 10/23/17 1:06 AM          Attending Attestation:     Physician Attestation Statement for NP/PA:   I discussed this assessment and plan of this patient with the NP/PA, but I did not personally examine the patient. The face to face encounter was performed by the NP/PA.                  ED Course      Clinical Impression:   The encounter diagnosis was Cellulitis of finger, unspecified laterality.    Disposition:   Disposition: Admitted  Condition: Stable                        Taylor Yoo MD  10/26/17 0510

## 2017-10-24 ENCOUNTER — ANESTHESIA (OUTPATIENT)
Dept: SURGERY | Facility: HOSPITAL | Age: 37
DRG: 983 | End: 2017-10-24
Payer: MEDICAID

## 2017-10-24 LAB
GRAM STN SPEC: NORMAL

## 2017-10-24 PROCEDURE — 25000003 PHARM REV CODE 250: Performed by: ORTHOPAEDIC SURGERY

## 2017-10-24 PROCEDURE — 25000003 PHARM REV CODE 250: Performed by: STUDENT IN AN ORGANIZED HEALTH CARE EDUCATION/TRAINING PROGRAM

## 2017-10-24 PROCEDURE — 87070 CULTURE OTHR SPECIMN AEROBIC: CPT | Mod: 59

## 2017-10-24 PROCEDURE — 76942 ECHO GUIDE FOR BIOPSY: CPT | Performed by: ANESTHESIOLOGY

## 2017-10-24 PROCEDURE — 63600175 PHARM REV CODE 636 W HCPCS: Performed by: NURSE ANESTHETIST, CERTIFIED REGISTERED

## 2017-10-24 PROCEDURE — 37000008 HC ANESTHESIA 1ST 15 MINUTES: Performed by: ORTHOPAEDIC SURGERY

## 2017-10-24 PROCEDURE — 99203 OFFICE O/P NEW LOW 30 MIN: CPT | Mod: 57,,, | Performed by: ORTHOPAEDIC SURGERY

## 2017-10-24 PROCEDURE — 26020 DRAIN HAND TENDON SHEATH: CPT | Mod: F7,,, | Performed by: ORTHOPAEDIC SURGERY

## 2017-10-24 PROCEDURE — A4216 STERILE WATER/SALINE, 10 ML: HCPCS | Performed by: ORTHOPAEDIC SURGERY

## 2017-10-24 PROCEDURE — 87205 SMEAR GRAM STAIN: CPT | Mod: 59

## 2017-10-24 PROCEDURE — 64415 NJX AA&/STRD BRCH PLXS IMG: CPT | Mod: 59,RT,GC, | Performed by: ANESTHESIOLOGY

## 2017-10-24 PROCEDURE — 87075 CULTR BACTERIA EXCEPT BLOOD: CPT | Mod: 59

## 2017-10-24 PROCEDURE — 87116 MYCOBACTERIA CULTURE: CPT | Mod: 59

## 2017-10-24 PROCEDURE — 71000044 HC DOSC ROUTINE RECOVERY FIRST HOUR: Performed by: ORTHOPAEDIC SURGERY

## 2017-10-24 PROCEDURE — 27200750 HC INSULATED NEEDLE/ STIMUPLEX: Performed by: ANESTHESIOLOGY

## 2017-10-24 PROCEDURE — D9220A PRA ANESTHESIA: Mod: ANES,,, | Performed by: ANESTHESIOLOGY

## 2017-10-24 PROCEDURE — 87077 CULTURE AEROBIC IDENTIFY: CPT

## 2017-10-24 PROCEDURE — 0KBC0ZZ EXCISION OF RIGHT HAND MUSCLE, OPEN APPROACH: ICD-10-PCS | Performed by: ORTHOPAEDIC SURGERY

## 2017-10-24 PROCEDURE — 71000015 HC POSTOP RECOV 1ST HR: Performed by: ORTHOPAEDIC SURGERY

## 2017-10-24 PROCEDURE — G0378 HOSPITAL OBSERVATION PER HR: HCPCS

## 2017-10-24 PROCEDURE — 63600175 PHARM REV CODE 636 W HCPCS: Performed by: ORTHOPAEDIC SURGERY

## 2017-10-24 PROCEDURE — 11000001 HC ACUTE MED/SURG PRIVATE ROOM

## 2017-10-24 PROCEDURE — 63600175 PHARM REV CODE 636 W HCPCS: Performed by: STUDENT IN AN ORGANIZED HEALTH CARE EDUCATION/TRAINING PROGRAM

## 2017-10-24 PROCEDURE — 25000003 PHARM REV CODE 250: Performed by: NURSE ANESTHETIST, CERTIFIED REGISTERED

## 2017-10-24 PROCEDURE — 37000009 HC ANESTHESIA EA ADD 15 MINS: Performed by: ORTHOPAEDIC SURGERY

## 2017-10-24 PROCEDURE — 36000705 HC OR TIME LEV I EA ADD 15 MIN: Performed by: ORTHOPAEDIC SURGERY

## 2017-10-24 PROCEDURE — 76942 ECHO GUIDE FOR BIOPSY: CPT | Mod: 26,,, | Performed by: ANESTHESIOLOGY

## 2017-10-24 PROCEDURE — 87186 SC STD MICRODIL/AGAR DIL: CPT | Mod: 59

## 2017-10-24 PROCEDURE — D9220A PRA ANESTHESIA: Mod: CRNA,,, | Performed by: NURSE ANESTHETIST, CERTIFIED REGISTERED

## 2017-10-24 PROCEDURE — 63600175 PHARM REV CODE 636 W HCPCS: Performed by: ANESTHESIOLOGY

## 2017-10-24 PROCEDURE — 3E0T3BZ INTRODUCTION OF ANESTHETIC AGENT INTO PERIPHERAL NERVES AND PLEXI, PERCUTANEOUS APPROACH: ICD-10-PCS | Performed by: ANESTHESIOLOGY

## 2017-10-24 PROCEDURE — 36000704 HC OR TIME LEV I 1ST 15 MIN: Performed by: ORTHOPAEDIC SURGERY

## 2017-10-24 PROCEDURE — 87102 FUNGUS ISOLATION CULTURE: CPT

## 2017-10-24 RX ORDER — SODIUM CHLORIDE 9 MG/ML
INJECTION, SOLUTION INTRAVENOUS CONTINUOUS
Status: DISCONTINUED | OUTPATIENT
Start: 2017-10-24 | End: 2017-10-24

## 2017-10-24 RX ORDER — LIDOCAINE HYDROCHLORIDE 10 MG/ML
1 INJECTION, SOLUTION EPIDURAL; INFILTRATION; INTRACAUDAL; PERINEURAL ONCE
Status: DISCONTINUED | OUTPATIENT
Start: 2017-10-24 | End: 2017-10-24

## 2017-10-24 RX ORDER — FENTANYL CITRATE 50 UG/ML
INJECTION, SOLUTION INTRAMUSCULAR; INTRAVENOUS
Status: DISCONTINUED | OUTPATIENT
Start: 2017-10-24 | End: 2017-10-24

## 2017-10-24 RX ORDER — HYDROMORPHONE HYDROCHLORIDE 1 MG/ML
0.2 INJECTION, SOLUTION INTRAMUSCULAR; INTRAVENOUS; SUBCUTANEOUS EVERY 5 MIN PRN
Status: DISCONTINUED | OUTPATIENT
Start: 2017-10-24 | End: 2017-10-24 | Stop reason: HOSPADM

## 2017-10-24 RX ORDER — SODIUM CHLORIDE 9 MG/ML
INJECTION, SOLUTION INTRAVENOUS CONTINUOUS PRN
Status: DISCONTINUED | OUTPATIENT
Start: 2017-10-24 | End: 2017-10-24

## 2017-10-24 RX ORDER — SODIUM CHLORIDE 0.9 % (FLUSH) 0.9 %
3 SYRINGE (ML) INJECTION
Status: DISCONTINUED | OUTPATIENT
Start: 2017-10-24 | End: 2017-10-25 | Stop reason: HOSPADM

## 2017-10-24 RX ORDER — MIDAZOLAM HYDROCHLORIDE 1 MG/ML
INJECTION, SOLUTION INTRAMUSCULAR; INTRAVENOUS
Status: DISCONTINUED | OUTPATIENT
Start: 2017-10-24 | End: 2017-10-24

## 2017-10-24 RX ORDER — MIDAZOLAM HYDROCHLORIDE 1 MG/ML
2 INJECTION INTRAMUSCULAR; INTRAVENOUS ONCE
Status: COMPLETED | OUTPATIENT
Start: 2017-10-24 | End: 2017-10-24

## 2017-10-24 RX ORDER — FENTANYL CITRATE 50 UG/ML
100 INJECTION, SOLUTION INTRAMUSCULAR; INTRAVENOUS ONCE
Status: COMPLETED | OUTPATIENT
Start: 2017-10-24 | End: 2017-10-24

## 2017-10-24 RX ADMIN — Medication 3 ML: at 10:10

## 2017-10-24 RX ADMIN — CEFTRIAXONE 2 G: 2 INJECTION, SOLUTION INTRAVENOUS at 02:10

## 2017-10-24 RX ADMIN — MIDAZOLAM HYDROCHLORIDE 2 MG: 1 INJECTION, SOLUTION INTRAMUSCULAR; INTRAVENOUS at 11:10

## 2017-10-24 RX ADMIN — SODIUM CHLORIDE: 0.9 INJECTION, SOLUTION INTRAVENOUS at 12:10

## 2017-10-24 RX ADMIN — OXYCODONE HYDROCHLORIDE 15 MG: 5 TABLET ORAL at 10:10

## 2017-10-24 RX ADMIN — OXYCODONE HYDROCHLORIDE 10 MG: 5 TABLET ORAL at 05:10

## 2017-10-24 RX ADMIN — ACETAMINOPHEN 1000 MG: 10 INJECTION, SOLUTION INTRAVENOUS at 08:10

## 2017-10-24 RX ADMIN — MIDAZOLAM HYDROCHLORIDE 2 MG: 1 INJECTION, SOLUTION INTRAMUSCULAR; INTRAVENOUS at 12:10

## 2017-10-24 RX ADMIN — HYDROMORPHONE HYDROCHLORIDE 0.5 MG: 1 INJECTION, SOLUTION INTRAMUSCULAR; INTRAVENOUS; SUBCUTANEOUS at 08:10

## 2017-10-24 RX ADMIN — SODIUM CHLORIDE, SODIUM GLUCONATE, SODIUM ACETATE, POTASSIUM CHLORIDE, MAGNESIUM CHLORIDE, SODIUM PHOSPHATE, DIBASIC, AND POTASSIUM PHOSPHATE: .53; .5; .37; .037; .03; .012; .00082 INJECTION, SOLUTION INTRAVENOUS at 01:10

## 2017-10-24 RX ADMIN — VANCOMYCIN HYDROCHLORIDE 1500 MG: 10 INJECTION, POWDER, LYOPHILIZED, FOR SOLUTION INTRAVENOUS at 03:10

## 2017-10-24 RX ADMIN — CELECOXIB 200 MG: 200 CAPSULE ORAL at 08:10

## 2017-10-24 RX ADMIN — ACETAMINOPHEN 1000 MG: 10 INJECTION, SOLUTION INTRAVENOUS at 03:10

## 2017-10-24 RX ADMIN — CEFTRIAXONE 2 G: 2 INJECTION, SOLUTION INTRAVENOUS at 06:10

## 2017-10-24 RX ADMIN — FENTANYL CITRATE 50 MCG: 50 INJECTION, SOLUTION INTRAMUSCULAR; INTRAVENOUS at 12:10

## 2017-10-24 RX ADMIN — PREGABALIN 75 MG: 75 CAPSULE ORAL at 08:10

## 2017-10-24 RX ADMIN — FENTANYL CITRATE 50 MCG: 50 INJECTION, SOLUTION INTRAMUSCULAR; INTRAVENOUS at 11:10

## 2017-10-24 NOTE — ANESTHESIA POSTPROCEDURE EVALUATION
"Anesthesia Post Evaluation    Patient: Ruddy Mann III    Procedure(s) Performed: Procedure(s) (LRB):  IRRIGATION AND DEBRIDEMENT-EXTRMITY-UPPER-HAND TABLE FOR STRETCHER-HAND SET - 6L NS W/ CYSTO (Right)    Final Anesthesia Type: general  Patient location during evaluation: PACU  Patient participation: Yes- Able to Participate  Level of consciousness: awake and alert  Post-procedure vital signs: reviewed and stable  Pain management: adequate  Airway patency: patent  PONV status at discharge: No PONV  Anesthetic complications: no      Cardiovascular status: hemodynamically stable and blood pressure returned to baseline  Respiratory status: unassisted and spontaneous ventilation  Hydration status: euvolemic  Follow-up not needed.        Visit Vitals  /60 (BP Location: Left arm, Patient Position: Lying)   Pulse 67   Temp 36.9 °C (98.4 °F) (Temporal)   Resp 18   Ht 5' 11" (1.803 m)   Wt 72.6 kg (160 lb)   SpO2 100%   BMI 22.32 kg/m²       Pain/Amaury Score: Pain Assessment Performed: Yes (10/24/2017  2:10 PM)  Presence of Pain: denies (10/24/2017  2:10 PM)  Pain Rating Prior to Med Admin: 0 (10/24/2017 11:09 AM)  Pain Rating Post Med Admin: 5 (10/23/2017  9:50 PM)  Amaury Score: 10 (10/24/2017  1:55 PM)      "

## 2017-10-24 NOTE — PLAN OF CARE
Problem: Patient Care Overview  Goal: Plan of Care Review  Outcome: Ongoing (interventions implemented as appropriate)  Pt is AAO x3.  VSS.  No injury/falls this shift.  Pt calls for assistance when needed and is indep with mobility.  Pain being monitored and controlled via PRN and meds.  No s/s of infection noted.  No s/s of skin breakdown noted.  Bed lowest position, call light within reach, siderails up x2.

## 2017-10-24 NOTE — PROGRESS NOTES
Ortho resident working with Dr. Ruggiero paged via  to notify that pt needs surgical consent and site bernard prior to scheduled surgery. Awaiting return call.

## 2017-10-24 NOTE — NURSING TRANSFER
Nursing Transfer Note      10/24/2017     Transfer To: [address]     Transfer via wheelchair    Transfer with none    Transported by RN    Medicines sent: none    Chart send with patient: Yes    Notified: RN    Patient reassessed at: 2:30 10/24    Upon arrival to floor: patient oriented to room, call bell in reach and bed in lowest position    RN given report

## 2017-10-24 NOTE — ANESTHESIA PROCEDURE NOTES
Axillary Brachial Plexus Single Injection Block    Patient location during procedure: pre-op   Block not for primary anesthetic.  Reason for block: at surgeon's request and post-op pain management   Post-op Pain Location: right hand pain  Start time: 10/24/2017 11:12 AM  Timeout: 10/24/2017 11:12 AM   End time: 10/24/2017 11:23 AM  Staffing  Anesthesiologist: MARILEE NJ  Resident/CRNA: CHANDLER STORY JR.  Performed: resident/CRNA   Preanesthetic Checklist  Completed: patient identified, site marked, surgical consent, pre-op evaluation, timeout performed, IV checked, risks and benefits discussed and monitors and equipment checked  Peripheral Block  Patient position: supine  Prep: ChloraPrep  Patient monitoring: heart rate, cardiac monitor, continuous pulse ox, continuous capnometry and frequent blood pressure checks  Block type: axillary  Laterality: right  Injection technique: single shot  Needle  Needle type: Stimuplex   Needle gauge: 21 G  Needle length: 4 in  Needle localization: anatomical landmarks and ultrasound guidance   -ultrasound image captured on disc.  Assessment  Injection assessment: negative aspiration and negative parasthesia  Paresthesia pain: none  Heart rate change: no  Slow fractionated injection: yes  Medications:  Bolus administered: 30 mL of 0.5 bupivacaine  Epinephrine added: 3.75 mcg/mL (1/300,000)  Additional Notes  VSS.  [facility] RN monitoring vitals throughout procedure.  Patient tolerated procedure well.

## 2017-10-24 NOTE — TRANSFER OF CARE
"Anesthesia Transfer of Care Note    Patient: Ruddy Mann III    Procedure(s) Performed: Procedure(s) (LRB):  IRRIGATION AND DEBRIDEMENT-EXTRMITY-UPPER-HAND TABLE FOR STRETCHER-HAND SET - 6L NS W/ CYSTO (Right)    Patient location: PACU    Anesthesia Type: general    Transport from OR: Transported from OR on room air with adequate spontaneous ventilation    Post pain: adequate analgesia    Post assessment: no apparent anesthetic complications    Post vital signs: stable    Level of consciousness: awake, alert and oriented    Nausea/Vomiting: no nausea/vomiting    Complications: none    Transfer of care protocol was followed      Last vitals:   Visit Vitals  BP (!) 104/58 (BP Location: Left arm, Patient Position: Lying)   Pulse 65   Temp 36.5 °C (97.7 °F) (Oral)   Resp 19   Ht 5' 11" (1.803 m)   Wt 72.6 kg (160 lb)   SpO2 100%   BMI 22.32 kg/m²     "

## 2017-10-24 NOTE — NURSING
I spoke with Tess Richmond about how the patient was complaining that no one had seen him today. I explained to her that Dr. De Luna did tell me that he would come and see the patient today. Tess spoke with patient and explained to him that I did escalate this to her. Tess also spoke with patient about medications and told patient that he needed to wait and talk to the Doctor about medications.

## 2017-10-24 NOTE — NURSING
Dr. Donahue notified of pt's request to be seen by an MD tonight and to have his pain medication increased.   stated that pt had already been seen by Dr. De Luna this am, and he would not be able to see him this evening.  New orders for pain medication received.

## 2017-10-24 NOTE — OP NOTE
DATE OF PROCEDURE:  10/24/2017    PRIMARY SURGEON:  Jd Ruggiero M.D.    FIRST ASSISTANT:  Adal De Luna M.D. (RES)    PREOPERATIVE DIAGNOSIS:  Right long finger dorsal hand abscess zone 4 through 6.    POSTOPERATIVE DIAGNOSIS:  Right long finger dorsal hand abscess zone 4 through   6.    PROCEDURES PERFORMED:  Irrigation and debridement of right dorsal hand abscess   including skin, subcutaneous tissue and muscle.  Area appeared less than 20   square cm.    ANESTHESIA:  Regional MAC.    ESTIMATED BLOOD LOSS: 5cc    URINE OUTPUT:  Nil.    SPECIMENS:  Abscess cultures x2.    COMPLICATIONS:  Nil.    DRAINS:  One Penrose exiting dorsal zone 4 on the long finger.    INDICATIONS FOR PROCEDURE:  Ruddy Mann is a 36-year-old male who presents   with a dorsal hand abscess including the long finger of several days' duration.    He presented to outside hospital prior to his presentation to [facility] and failed outpatient antibiotic therapy.  His admission date was   10/22/2017.  The patient was elevated and started on IV antibiotics and   underwent MRI.  Upon MRI, it looked as though there was an early formation of   abscess along the dorsal radial aspect of the long finger.  He was elevated for   an additional 24 hours; however, he did not improve clinically and therefore, it   was determined that operative intervention would provide the best course of   care for the patient moving forward.    DESCRIPTION OF INFORMED CONSENT:  Risks, benefits, and alternatives of the   procedure were discussed in detail with the patient.  These include but are not   limited to pain, bleeding, scarring, infection, loss of range of motion, damage   to the extensor zheng, subluxation of tendons, damage to major neurovascular   structures including the digital neurovascular bundles, chronic pain, complex   regional pain syndrome, posttraumatic arthritis, persistent infection, need for   further surgical interventions and soft  tissue wound healing complications that   may require myocutaneous flap.  All questions were answered in detail.  The   patient agreed with the treatment plan and consents were signed in the hospital   setting.    DESCRIPTION OF PROCEDURE:  The patient was brought to the preoperative area at   which point regional anesthetic was applied by the Anesthesia team without   complication.  The patient was then brought to the Operating Room and the hand   table was placed directly [place].  Following this, the patient   was then prepped and draped in the standard sterile fashion with a forearm   tourniquet placed.  Following placement of drapes, a full timeout was undertaken   to identify the patient, operative site, procedure to be performed, antibiotics   status and to address any surgical, anesthetic or nursing concerns.  There are   no contraindications for surgery.  A pinch test was then performed from the   forearm to the hand.  The patient had good overall regional anesthetic and   therefore we elected to proceed.  A midline dorsal incision was made over zone 4   of the long finger on the right side between the PIP and the MCP joints.  The   incision was carried down through the skin and subcutaneous tissues.  A mosquito   was then taken and used to bluntly spread these deep tissues down the radial   aspect of the P1 phalanx.  At this time, a significant amount of purulence was   encountered and extruded from the wound.  The wound was then milked with   approximately 10 to 15 mL of pus removed from the dorsal hand.  A small window   was then made in the mid aspect of the dorsal hand at the base of the abscess   pocket and a mosquito was inserted in both the proximal to distal and distal to   proximal direction to ensure that all loculations had been removed from the   abscess pocket.  Following exploration of the abscess, 6 liters of normal saline   was then used with cysto tubing to irrigate the  wound.  A Penrose drain was   then placed through and through both incisions.  The distal incision over the P1   phalanx of the long finger was then closed with the exception of the   midsubstance where the Penrose drain was exiting.  The drain was then pulled   into the center-center portion of the abscess proximally at the level of the MCP   joint and the dorsal hand incision was then closed with #3-0 nylon sutures in   horizontal mattress fashion.  The patient was then placed in a sterile dressing.    The drapes taken down and the patient was transferred to the PACU in stable   condition.    DISPOSITION:  The patient will now begin current antibiotic therapy pending ID   consultation.  There was a significant amount of purulence present; however,   given his symptoms preoperatively, intra-articular septic arthritis of the long   finger metacarpophalangeal joint was not suspected.  We will continue to watch   his symptoms over the next couple of days and if he develops significant pain   with range of motion or fails to improve, we will consider revision irrigation   and debridement.    DICTATED BY:  Adal De Luna M.D. (RES)      GEORGES  dd: 10/24/2017 13:50:08 (CDT)  td: 10/24/2017 16:09:00 (CDT)  Doc ID   #[id number]  Job ID #[id number]    CC:

## 2017-10-25 VITALS
SYSTOLIC BLOOD PRESSURE: 119 MMHG | TEMPERATURE: 98 F | HEIGHT: 71 IN | DIASTOLIC BLOOD PRESSURE: 66 MMHG | RESPIRATION RATE: 18 BRPM | OXYGEN SATURATION: 98 % | BODY MASS INDEX: 22.4 KG/M2 | HEART RATE: 84 BPM | WEIGHT: 160 LBS

## 2017-10-25 LAB — VANCOMYCIN SERPL-MCNC: 25.2 UG/ML

## 2017-10-25 PROCEDURE — 25000003 PHARM REV CODE 250: Performed by: ORTHOPAEDIC SURGERY

## 2017-10-25 PROCEDURE — A4216 STERILE WATER/SALINE, 10 ML: HCPCS | Performed by: ORTHOPAEDIC SURGERY

## 2017-10-25 PROCEDURE — 36415 COLL VENOUS BLD VENIPUNCTURE: CPT

## 2017-10-25 PROCEDURE — 80202 ASSAY OF VANCOMYCIN: CPT

## 2017-10-25 PROCEDURE — 63600175 PHARM REV CODE 636 W HCPCS: Performed by: ORTHOPAEDIC SURGERY

## 2017-10-25 PROCEDURE — 99223 1ST HOSP IP/OBS HIGH 75: CPT | Mod: ,,, | Performed by: INTERNAL MEDICINE

## 2017-10-25 PROCEDURE — 11000001 HC ACUTE MED/SURG PRIVATE ROOM

## 2017-10-25 PROCEDURE — 25000003 PHARM REV CODE 250: Performed by: STUDENT IN AN ORGANIZED HEALTH CARE EDUCATION/TRAINING PROGRAM

## 2017-10-25 RX ORDER — SULFAMETHOXAZOLE AND TRIMETHOPRIM 800; 160 MG/1; MG/1
1 TABLET ORAL 2 TIMES DAILY
Qty: 28 TABLET | Refills: 0 | Status: SHIPPED | OUTPATIENT
Start: 2017-10-25 | End: 2017-10-25

## 2017-10-25 RX ORDER — HYDROCODONE BITARTRATE AND ACETAMINOPHEN 5; 325 MG/1; MG/1
1 TABLET ORAL EVERY 4 HOURS PRN
Qty: 44 TABLET | Refills: 0 | Status: SHIPPED | OUTPATIENT
Start: 2017-10-25

## 2017-10-25 RX ORDER — HYDROMORPHONE HYDROCHLORIDE 1 MG/ML
0.5 INJECTION, SOLUTION INTRAMUSCULAR; INTRAVENOUS; SUBCUTANEOUS ONCE
Status: COMPLETED | OUTPATIENT
Start: 2017-10-25 | End: 2017-10-25

## 2017-10-25 RX ORDER — SULFAMETHOXAZOLE AND TRIMETHOPRIM 800; 160 MG/1; MG/1
1 TABLET ORAL 3 TIMES DAILY
Qty: 42 TABLET | Refills: 0 | Status: SHIPPED | OUTPATIENT
Start: 2017-10-25 | End: 2017-11-08

## 2017-10-25 RX ADMIN — OXYCODONE HYDROCHLORIDE 15 MG: 5 TABLET ORAL at 04:10

## 2017-10-25 RX ADMIN — HYDROMORPHONE HYDROCHLORIDE 0.5 MG: 1 INJECTION, SOLUTION INTRAMUSCULAR; INTRAVENOUS; SUBCUTANEOUS at 01:10

## 2017-10-25 RX ADMIN — HYDROMORPHONE HYDROCHLORIDE 0.5 MG: 1 INJECTION, SOLUTION INTRAMUSCULAR; INTRAVENOUS; SUBCUTANEOUS at 12:10

## 2017-10-25 RX ADMIN — OXYCODONE HYDROCHLORIDE 15 MG: 5 TABLET ORAL at 09:10

## 2017-10-25 RX ADMIN — CEFTRIAXONE 2 G: 2 INJECTION, SOLUTION INTRAVENOUS at 03:10

## 2017-10-25 RX ADMIN — ACETAMINOPHEN 1000 MG: 500 TABLET ORAL at 05:10

## 2017-10-25 RX ADMIN — ACETAMINOPHEN 1000 MG: 500 TABLET ORAL at 11:10

## 2017-10-25 RX ADMIN — VANCOMYCIN HYDROCHLORIDE 1500 MG: 10 INJECTION, POWDER, LYOPHILIZED, FOR SOLUTION INTRAVENOUS at 01:10

## 2017-10-25 RX ADMIN — Medication 3 ML: at 06:10

## 2017-10-25 RX ADMIN — OXYCODONE HYDROCHLORIDE 15 MG: 5 TABLET ORAL at 05:10

## 2017-10-25 RX ADMIN — Medication 3 ML: at 03:10

## 2017-10-25 RX ADMIN — CEFTRIAXONE 2 G: 2 INJECTION, SOLUTION INTRAVENOUS at 05:10

## 2017-10-25 RX ADMIN — HYDROMORPHONE HYDROCHLORIDE 0.5 MG: 1 INJECTION, SOLUTION INTRAMUSCULAR; INTRAVENOUS; SUBCUTANEOUS at 07:10

## 2017-10-25 NOTE — ASSESSMENT & PLAN NOTE
1. Aerobic cultures taken from wound (not synovial fluid) during I&D positive for S. Aureus, sensitivities pending. Patient ready to be discharged home today. Recommend discharging home on Bactrim DS 1 tab by mouth TID for coverage of potential MRSA or MSSA.  2. Encourage patient to drink plenty of fluids.   3. Follow up in ID clinic on Monday to assess how incision healing, and review sensitivities.

## 2017-10-25 NOTE — SUBJECTIVE & OBJECTIVE
History reviewed. No pertinent past medical history.    History reviewed. No pertinent surgical history.    Review of patient's allergies indicates:  No Known Allergies    Medications:  Prescriptions Prior to Admission   Medication Sig    ibuprofen (ADVIL,MOTRIN) 800 MG tablet Take 800 mg by mouth every 12 (twelve) hours as needed for Pain.    [DISCONTINUED] sulfamethoxazole-trimethoprim 800-160mg (BACTRIM DS) 800-160 mg Tab Take 1 tablet by mouth every 12 (twelve) hours.    [DISCONTINUED] tramadol (ULTRAM) 50 mg tablet Take 50 mg by mouth every 6 (six) hours as needed for Pain. 1-2 tablets     Antibiotics     Start     Stop Route Frequency Ordered    10/23/17 1400  cefTRIAXone (ROCEPHIN) 2 g in dextrose 5 % 50 mL IVPB      -- IV Every 12 hours (non-standard times) 10/23/17 0136    10/23/17 1400  vancomycin (VANCOCIN) 1,500 mg in dextrose 5 % 250 mL IVPB  (Vancomycin IVPB with levels panel)      -- IV Every 12 hours (non-standard times) 10/23/17 0136        Antifungals     None        Antivirals     None           Immunization History   Administered Date(s) Administered    Tdap 10/22/2017       Family History     Problem Relation (Age of Onset)    No Known Problems Mother, Father        Social History     Social History    Marital status:      Spouse name: N/A    Number of children: N/A    Years of education: N/A     Social History Main Topics    Smoking status: Never Smoker    Smokeless tobacco: Never Used    Alcohol use No    Drug use: No    Sexual activity: Yes     Partners: Female     Birth control/ protection: None     Other Topics Concern    None     Social History Narrative    None     Review of Systems   Constitutional: Negative for activity change, appetite change, chills and fever.   HENT: Negative for congestion and sore throat.    Eyes: Negative for discharge.   Respiratory: Negative for cough, chest tightness and shortness of breath.    Cardiovascular: Negative for chest pain and  leg swelling.   Gastrointestinal: Negative for abdominal distention, abdominal pain, diarrhea, nausea and vomiting.   Endocrine: Negative for cold intolerance and heat intolerance.   Genitourinary: Negative for difficulty urinating.   Skin: Positive for wound. Negative for rash.   Allergic/Immunologic: Negative.    Neurological: Negative for dizziness and light-headedness.   Hematological: Negative.    Psychiatric/Behavioral: Negative for confusion. The patient is nervous/anxious.      Objective:     Vital Signs (Most Recent):  Temp: 98.2 °F (36.8 °C) (10/25/17 1554)  Pulse: 84 (10/25/17 1554)  Resp: 18 (10/25/17 1554)  BP: 119/66 (10/25/17 1554)  SpO2: 98 % (10/25/17 1153) Vital Signs (24h Range):  Temp:  [96.8 °F (36 °C)-98.6 °F (37 °C)] 98.2 °F (36.8 °C)  Pulse:  [71-84] 84  Resp:  [16-18] 18  SpO2:  [96 %-100 %] 98 %  BP: (117-145)/(61-74) 119/66     Weight: 72.6 kg (160 lb)  Body mass index is 22.32 kg/m².    Estimated Creatinine Clearance: 79.9 mL/min (based on SCr of 1.3 mg/dL).    Physical Exam   Constitutional: He is oriented to person, place, and time. Vital signs are normal. He appears well-developed and well-nourished.   HENT:   Head: Normocephalic and atraumatic.   Right Ear: External ear normal.   Left Ear: External ear normal.   Nose: Nose normal.   Eyes: Conjunctivae, EOM and lids are normal. Right eye exhibits no discharge. Left eye exhibits no discharge.   Neck: Trachea normal, normal range of motion and full passive range of motion without pain. Neck supple. No JVD present.   Cardiovascular: Normal rate and regular rhythm.    Pulmonary/Chest: Effort normal and breath sounds normal. No respiratory distress.   Abdominal: Soft. Normal appearance. He exhibits no distension.   Genitourinary: No discharge found.   Musculoskeletal: Normal range of motion. He exhibits no edema.   Neurological: He is alert and oriented to person, place, and time.   Skin: Skin is warm, dry and intact. Capillary refill takes  less than 2 seconds.   Suture to right dorsal long finger.Small open area at sight of recent penrose drain. Slight redness, swelling, and purulent drainage noted.    Psychiatric: His speech is normal and behavior is normal. Thought content normal. Cognition and memory are normal.   Nursing note and vitals reviewed.      Significant Labs:   Blood Culture:   Recent Labs  Lab 10/22/17  1700 10/22/17  1755   LABBLOO No Growth to date  No Growth to date  No Growth to date No Growth to date  No Growth to date  No Growth to date     Wound Culture:   Recent Labs  Lab 10/24/17  1316 10/24/17  1322   LABAERO STAPHYLOCOCCUS AUREUSFewSusceptibility pending STAPHYLOCOCCUS AUREUSModerateSusceptibility pending     All pertinent labs within the past 24 hours have been reviewed.    Significant Imaging: I have reviewed all pertinent imaging results/findings within the past 24 hours.

## 2017-10-25 NOTE — HPI
Mr. Mann is a 36 y.o. male with presents with right dorsal long finger cellulitis and possible abscess formation. Patient states about two weeks ago he was trimming orange trees and was scratched up by thorns on the tree. He noted multiple small pustules to hands and arms that he picked. Last weekend he noted that the area on his right dorsal long finger was more red, swollen, and painful. [facility] and was given IV-abx and discharged home with a prescription for bactrim. Presented to the ED on 10/23/17 with worsening pain and loss in ROM. Denies any volar pain, pain with passive extension or pain along the flexor tendon sheath. Ortho brought to [facility].R. for I/D of right dorsal hand abscess including skin, subcutaneous tissue and muscle. Per Ortho there was a significant amount of purulence drainage cleaned from wound; however, given his symptoms preoperatively, intra-articular septic arthritis of the long finger metacarpophalangeal joint was not suspected. A penrose drain was placed and removed today. Today hand less swelling and redness noted. Patient able to bend fingers with minimal pain.    Patient has not pertinent past medical or surgical history. He denies any fever or chills.

## 2017-10-25 NOTE — CONSULTS
[facility]  Infectious Disease  Consult Note    Patient Name: Ruddy Mann III  MRN: [id number]  Admission Date: 10/22/2017  Hospital Length of Stay: 0 days  Attending Physician: Jd Ruggiero MD  Primary Care Provider: [facility]     Isolation Status: No active isolations    Patient information was obtained from patient, past medical records and ER records.      Consults  Assessment/Plan:     * Cellulitis of finger    1. Aerobic cultures taken from wound (not synovial fluid) during I&D positive for S. Aureus, sensitivities pending. Patient ready to be discharged home today. Recommend discharging home on Bactrim DS 1 tab by mouth TID for coverage of potential MRSA or MSSA.  2. Encourage patient to drink plenty of fluids.   3. Follow up in ID clinic on Monday to assess how incision healing, and review sensitivities.             Thank you for your consult. I will sign off. Please contact us if you have any additional questions.    KATE Gao  Infectious Disease  [facility]    Subjective:     Principal Problem: Cellulitis of finger    HPI: Mr. Mann is a 36 y.o. male with presents with right dorsal long finger cellulitis and possible abscess formation. Patient states about two weeks ago he was trimming orange trees and was scratched up by thorns on the tree. He noted multiple small pustules to hands and arms that he picked. Last weekend he noted that the area on his right dorsal long finger was more red, swollen, and painful. Patient went to [facility] 10/22/17 and was given IV-abx and discharged home with a prescription for bactrim. Presented to the ED on 10/23/17 with worsening pain and loss in ROM. Denies any volar pain, pain with passive extension or pain along the flexor tendon sheath. Ortho brought to [facility] for I/D of right dorsal hand abscess including skin, subcutaneous tissue and muscle. Per Ortho there was a significant amount of purulence drainage  cleaned from wound; however, given his symptoms preoperatively, intra-articular septic arthritis of the long finger metacarpophalangeal joint was not suspected. A penrose drain was placed and removed today. Today hand less swelling and redness noted. Patient able to bend fingers with minimal pain.    Patient has not pertinent past medical or surgical history. He denies any fever or chills.        History reviewed. No pertinent past medical history.    History reviewed. No pertinent surgical history.    Review of patient's allergies indicates:  No Known Allergies    Medications:  Prescriptions Prior to Admission   Medication Sig    ibuprofen (ADVIL,MOTRIN) 800 MG tablet Take 800 mg by mouth every 12 (twelve) hours as needed for Pain.    [DISCONTINUED] sulfamethoxazole-trimethoprim 800-160mg (BACTRIM DS) 800-160 mg Tab Take 1 tablet by mouth every 12 (twelve) hours.    [DISCONTINUED] tramadol (ULTRAM) 50 mg tablet Take 50 mg by mouth every 6 (six) hours as needed for Pain. 1-2 tablets     Antibiotics     Start     Stop Route Frequency Ordered    10/23/17 1400  cefTRIAXone (ROCEPHIN) 2 g in dextrose 5 % 50 mL IVPB      -- IV Every 12 hours (non-standard times) 10/23/17 0136    10/23/17 1400  vancomycin (VANCOCIN) 1,500 mg in dextrose 5 % 250 mL IVPB  (Vancomycin IVPB with levels panel)      -- IV Every 12 hours (non-standard times) 10/23/17 0136        Antifungals     None        Antivirals     None           Immunization History   Administered Date(s) Administered    Tdap 10/22/2017       Family History     Problem Relation (Age of Onset)    No Known Problems Mother, Father        Social History     Social History    Marital status:      Spouse name: N/A    Number of children: N/A    Years of education: N/A     Social History Main Topics    Smoking status: Never Smoker    Smokeless tobacco: Never Used    Alcohol use No    Drug use: No    Sexual activity: Yes     Partners: Female     Birth control/  protection: None     Other Topics Concern    None     Social History Narrative    None     Review of Systems   Constitutional: Negative for activity change, appetite change, chills and fever.   HENT: Negative for congestion and sore throat.    Eyes: Negative for discharge.   Respiratory: Negative for cough, chest tightness and shortness of breath.    Cardiovascular: Negative for chest pain and leg swelling.   Gastrointestinal: Negative for abdominal distention, abdominal pain, diarrhea, nausea and vomiting.   Endocrine: Negative for cold intolerance and heat intolerance.   Genitourinary: Negative for difficulty urinating.   Skin: Positive for wound. Negative for rash.   Allergic/Immunologic: Negative.    Neurological: Negative for dizziness and light-headedness.   Hematological: Negative.    Psychiatric/Behavioral: Negative for confusion. The patient is nervous/anxious.      Objective:     Vital Signs (Most Recent):  Temp: 98.2 °F (36.8 °C) (10/25/17 1554)  Pulse: 84 (10/25/17 1554)  Resp: 18 (10/25/17 1554)  BP: 119/66 (10/25/17 1554)  SpO2: 98 % (10/25/17 1153) Vital Signs (24h Range):  Temp:  [96.8 °F (36 °C)-98.6 °F (37 °C)] 98.2 °F (36.8 °C)  Pulse:  [71-84] 84  Resp:  [16-18] 18  SpO2:  [96 %-100 %] 98 %  BP: (117-145)/(61-74) 119/66     Weight: 72.6 kg (160 lb)  Body mass index is 22.32 kg/m².    Estimated Creatinine Clearance: 79.9 mL/min (based on SCr of 1.3 mg/dL).    Physical Exam   Constitutional: He is oriented to person, place, and time. Vital signs are normal. He appears well-developed and well-nourished.   HENT:   Head: Normocephalic and atraumatic.   Right Ear: External ear normal.   Left Ear: External ear normal.   Nose: Nose normal.   Eyes: Conjunctivae, EOM and lids are normal. Right eye exhibits no discharge. Left eye exhibits no discharge.   Neck: Trachea normal, normal range of motion and full passive range of motion without pain. Neck supple. No JVD present.   Cardiovascular: Normal rate  and regular rhythm.    Pulmonary/Chest: Effort normal and breath sounds normal. No respiratory distress.   Abdominal: Soft. Normal appearance. He exhibits no distension.   Genitourinary: No discharge found.   Musculoskeletal: Normal range of motion. He exhibits no edema.   Neurological: He is alert and oriented to person, place, and time.   Skin: Skin is warm, dry and intact. Capillary refill takes less than 2 seconds.   Suture to right dorsal long finger.Small open area at sight of recent penrose drain. Slight redness, swelling, and purulent drainage noted.    Psychiatric: His speech is normal and behavior is normal. Thought content normal. Cognition and memory are normal.   Nursing note and vitals reviewed.      Significant Labs:   Blood Culture:   Recent Labs  Lab 10/22/17  1700 10/22/17  1755   LABBLOO No Growth to date  No Growth to date  No Growth to date No Growth to date  No Growth to date  No Growth to date     Wound Culture:   Recent Labs  Lab 10/24/17  1316 10/24/17  1322   LABAERO STAPHYLOCOCCUS AUREUSFewSusceptibility pending STAPHYLOCOCCUS AUREUSModerateSusceptibility pending     All pertinent labs within the past 24 hours have been reviewed.    Significant Imaging: I have reviewed all pertinent imaging results/findings within the past 24 hours.

## 2017-10-25 NOTE — CONSULTS
[facility]  Infectious Disease  Consult Note    Patient Name: Ruddy Mann III  MRN: [id number]  Admission Date: 10/22/2017  Hospital Length of Stay: 0 days  Attending Physician: Jd Ruggiero MD  Primary Care Provider: [facility]     Isolation Status: No active isolations        Inpatient consult to Infectious Diseases  Consult performed by: RAEGAN RHODES  Consult ordered by: ZENY PATTERSON  Reason for consult: finger cellulitis         ID consult received. Full consult to follow today. In the interim please call with any concerns.       Raegan Rhodes, CNS  Infectious Disease  Spectra [id number]  [facility]

## 2017-10-25 NOTE — PLAN OF CARE
Problem: Patient Care Overview  Goal: Plan of Care Review  Outcome: Ongoing (interventions implemented as appropriate)  Patient in bed resting. Complaints of pain relieved with PRN pain medication. No acute distress noted. Activity encouraged. Regular diet tolerated well with no complaints of N/V. No falls noted. Call light within reach and bed in lowest position, bed wheels locked.

## 2017-10-25 NOTE — NURSING
Pt discharged in stable condition, discharge instructions and prescriptions given, pt verbalized understanding. Patient instructed to clean hand BID and I suggested to patient and wife to take pictures of his hand daily if he did not think he would would be able to tell if there was change. Surgical site intact.pt waiting on medication clarification. Shwetha Dallas RN

## 2017-10-26 ENCOUNTER — TELEPHONE (OUTPATIENT)
Dept: ORTHOPEDICS | Facility: CLINIC | Age: 37
End: 2017-10-26

## 2017-10-26 LAB
BACTERIA SPEC AEROBE CULT: NORMAL
BACTERIA SPEC AEROBE CULT: NORMAL

## 2017-10-26 NOTE — PLAN OF CARE
Patient discharged on 10/25/17 at 1645- see RN notes.    Patient admitted with right finger cellulitis and abscess. I&D performed 10/24/17. Patient discharged home with self-care/family support and PO antibiotics (see ID note). Discharge and follow-up instructions completed by the bedside nurse.    Future Appointments  Date Time Provider Department Center   11/7/2017 1:15 PM Anne Alvarez PA-C [facility]      10/26/17 0732   Final Note   Assessment Type Final Discharge Note   Discharge Disposition Home   What phone number can be called within the next 1-3 days to see how you are doing after discharge? ([phone])   Hospital Follow Up  Appt(s) scheduled? Yes   Discharge plans and expectations educations in teach back method with documentation complete? Yes

## 2017-10-26 NOTE — TELEPHONE ENCOUNTER
Spoke with pt.  States he is doing well.  Pt given appointment info for wound check appointment 10/30 at 9 am.  Pt verbalized understanding.

## 2017-10-26 NOTE — PHYSICIAN QUERY
"PT Name: Ruddy Mann III  MR #: [id number]    Physician Query Form - Procedure Clarification     CDS/: Whitney Estevez RN  CCDS               Contact information: [email or web address]  This form is a permanent document in the medical record.     Query Date: October 26, 2017  By submitting this query, we are merely seeking further clarification of documentation. Please utilize your independent clinical judgment when addressing the question(s) below.    The Medical record contains the following:     Indicators       Supporting Clinical Findings   Location in Medical Record   X Documentation of "Debridement"    Irrigation and debridement of right dorsal hand abscess including skin, subcutaneous tissue and muscle   The incision was carried down through the skin and subcutaneous tissues. A mosquito was then taken and used to bluntly spread these deep tissues down the radial aspect of the P1 phalanx. At this time, a significant amount of purulence was encountered and extruded from the wound. The wound was then milked with approximately 10 to 15 mL of pus removed from the dorsal hand. A small window was then made in the mid aspect of the dorsal hand at the base of the abscess pocket and a mosquito was inserted in both the proximal to distal and distal to proximal direction to ensure that all loculations had been removed from the abscess pocket.    OP note 10/24    Documentation of "I & D"      EBL =      Other:       Excisional debridement is a surgical removal of  nonvitalized tissue, necrosis or slough. The use of a sharp instrument does not always indicate that an excisional debridement was performed.  Non excisional debridement is the scraping away or removal of loose tissue fragments.    Provider, please specify type of procedure(s) performed:    [ x ] Excisional Debridement (Specify site, type, depth of tissue removal ,instrument, size of wound & EBL)   * Site: " (Specify)_____________________________________   * Depth of tissue excised:    [  ] Skin/Subcutaneous [ ] Soft tissue [ ] Fascia [x ] Muscle [x ] Bone   * Instrument used:    [x ] Scalpel [x ] Scissors [ x] Curette [ ] Other (Specify) ____________________   * Size of wound after debridement: (Specify) _____________________________   * EBL (Specify) __________________________________      [  ] Non Excisional Debridement   * Depth of tissue debrided:    [  ] Skin/Subcutaneous [ ] Soft tissue [ ] Fascia [ ] Muscle [ ] Bone      [  ] Other Procedure (Specify) ________________________________    [  ] Clinically Undetermined

## 2017-10-26 NOTE — TELEPHONE ENCOUNTER
----- Message from Adal De Luna MD sent at 10/26/2017  8:36 AM CDT -----  Patient requires wound check on Monday. May keep 11/7 visit for possible suture removal. Thank you. He will also be seeing ID (Lizbeth or PA) on Monday.

## 2017-10-26 NOTE — ADDENDUM NOTE
Addendum  created 10/26/17 1134 by Amaury Hardy Jr., MD    Anesthesia Attestations filed, Sign clinical note

## 2017-10-27 PROBLEM — A49.01 STAPH AUREUS INFECTION: Status: ACTIVE | Noted: 2017-10-27

## 2017-10-27 LAB
BACTERIA BLD CULT: NORMAL
BACTERIA BLD CULT: NORMAL

## 2017-10-30 ENCOUNTER — OFFICE VISIT (OUTPATIENT)
Dept: ORTHOPEDICS | Facility: CLINIC | Age: 37
End: 2017-10-30
Payer: MEDICAID

## 2017-10-30 VITALS
SYSTOLIC BLOOD PRESSURE: 121 MMHG | BODY MASS INDEX: 22.23 KG/M2 | HEART RATE: 81 BPM | DIASTOLIC BLOOD PRESSURE: 65 MMHG | HEIGHT: 71 IN | WEIGHT: 158.75 LBS

## 2017-10-30 DIAGNOSIS — L03.011 CELLULITIS OF FINGER OF RIGHT HAND: Primary | ICD-10-CM

## 2017-10-30 DIAGNOSIS — A49.01 STAPH AUREUS INFECTION: ICD-10-CM

## 2017-10-30 LAB
BACTERIA SPEC ANAEROBE CULT: NORMAL
BACTERIA SPEC ANAEROBE CULT: NORMAL

## 2017-10-30 PROCEDURE — 99024 POSTOP FOLLOW-UP VISIT: CPT | Mod: ,,, | Performed by: PHYSICIAN ASSISTANT

## 2017-10-30 PROCEDURE — 99999 PR PBB SHADOW E&M-EST. PATIENT-LVL III: CPT | Mod: PBBFAC,,, | Performed by: PHYSICIAN ASSISTANT

## 2017-10-30 PROCEDURE — 99213 OFFICE O/P EST LOW 20 MIN: CPT | Mod: PBBFAC | Performed by: PHYSICIAN ASSISTANT

## 2017-10-30 NOTE — PROGRESS NOTES
Mr. Mann is a 36-year-old male who presented with a dorsal hand abscess including the long finger. He had failed out patient antibiotic therapy. He was then admitted on 10/22/2017 for IV antibiotics and did not improve clinically. He was then treated with I and D on 10/24/2017.     He is here today for a post-operative visit after a    Irrigation and debridement of right dorsal hand abscess   including skin, subcutaneous tissue and muscle.  Area appeared less than 20   square cm.  by  on 10/24/2017.  MICRO: Methicillin resistant staphylococcus aureus: currently taking Bactrim DS which is sensitive ; projected end date of 11/08/2017    he reports that he is doing ok.  Pain is controlled.  he is taking pain medication.   He reports that he is  taking antibiotics as prescribed.   He does report decreased sensation to tips of all fingers. He stated that he is getting shooting pain into his fingers intermittently.  he denies fever, chills, and sweats since the time of the surgery.     Physical exam:  Post op dressing taken down.  Incision is clean, dry and intact. Sutures left in place.       Assessment:  Post-op visit ( 1 weeks)    Plan:  - Keep area covered.   - continue antibiotics as prescribed.   - Contacted ID regarding follow up appointment, they stated that they will contact the patient.   - pain medication: norefill needed,    Reviewed pain medication refill policy to patient.   - Patient is to return to clinic in 1 week at time consider removal of sutures.

## 2017-10-30 NOTE — DISCHARGE SUMMARY
[facility]  Orthopedics  Discharge Summary      Patient Name: Ruddy Mann III  MRN: [id number]  Admission Date: 10/22/2017  Hospital Length of Stay: 1 days  Discharge Date and Time: 10/25/2017  5:20 PM  Attending Physician: Jd Ruggiero MD  Discharging Provider: Lauren Tierney MD  Primary Care Provider: [facility]    HPI:   Ruddy Mann III is a 36 y.o. male who presents with right dorsal long finger cellulitis and possible abscess formation. Patient went to [facility] yesterday and was given IV-abx and discharged home. Patient was also prescribed bactrim. He presents today with worsening pain and loss in ROM. Denies any volar pain, pain with passive extension or pain along the flexor tendon sheath.    Procedure(s) (LRB):  IRRIGATION AND DEBRIDEMENT-EXTRMITY-UPPER-HAND TABLE FOR STRETCHER-HAND SET - 6L NS W/ CYSTO (Right)      Hospital Course:  Patient presented for above procedure.  Tolerated it well and was discharged home POD3 after voiding, tolerating diet, ambulating, pain controlled, and clinical improvement of hand infection. Discharge instructions, follow-up appointment, and med rec are below.      Consults         Status Ordering Provider     Inpatient consult to Infectious Diseases  Once     Provider:  (Not yet assigned)    Completed ZENY PATTERSON     Inpatient consult to Orthopedics  Once     Provider:  (Not yet assigned)    Completed ANABELLE REYES          Significant Diagnostic Studies: No pertinent studies.    Pending Diagnostic Studies:     None        Final Active Diagnoses:    Diagnosis Date Noted POA    PRINCIPAL PROBLEM:  Cellulitis of finger [L03.019] 10/23/2017 Yes    Staph aureus infection [A49.01] 10/27/2017 Yes      Problems Resolved During this Admission:    Diagnosis Date Noted Date Resolved POA      Discharged Condition: good    Disposition: Home or Self Care    Follow Up:  Follow-up Information     Tara Nieves MD On 10/30/2017.     Specialty:  Infectious Diseases  Contact information:  [address]  [facility] [address]  [phone]             Jd Ruggiero MD On 10/30/2017.    Specialties:  Orthopedic Surgery, Spine Surgery  Contact information:  [address]  [facility] [address]  [phone]                 Patient Instructions:     Diet general     Call MD for:  increased confusion or weakness     Call MD for:  persistent dizziness, light-headedness, or visual disturbances     Call MD for:  worsening rash     Call MD for:  severe persistent headache     Call MD for:  redness, tenderness, or signs of infection (pain, swelling, redness, odor or green/yellow discharge around incision site)     Call MD for:  severe uncontrolled pain     Call MD for:  difficulty breathing or increased cough     Call MD for:  persistent nausea and vomiting or diarrhea     Call MD for:  temperature >100.4     Weight bearing restrictions (specify)   Order Comments: NWB ARELY     Change dressing (specify)   Order Comments: Keep wounds clean and covered  Betadine / Sterile Water soaks BID x10 minutes per soak.       Medications:  Reconciled Home Medications:   Discharge Medication List as of 10/25/2017  4:30 PM      START taking these medications    Details   hydrocodone-acetaminophen 5-325mg (NORCO) 5-325 mg per tablet Take 1 tablet by mouth every 4 (four) hours as needed for Pain., Starting Wed 10/25/2017, Print         CONTINUE these medications which have CHANGED    Details   sulfamethoxazole-trimethoprim 800-160mg (BACTRIM DS) 800-160 mg Tab Take 1 tablet by mouth 3 (three) times daily., Starting Wed 10/25/2017, Until Wed 11/8/2017, Print         CONTINUE these medications which have NOT CHANGED    Details   ibuprofen (ADVIL,MOTRIN) 800 MG tablet Take 800 mg by mouth every 12 (twelve) hours as needed for Pain., Historical Med         STOP taking these medications       tramadol (ULTRAM) 50 mg tablet Comments:   Reason for Stopping:                Lauren Tierney MD  Orthopedics  [facility]

## 2017-11-07 ENCOUNTER — OFFICE VISIT (OUTPATIENT)
Dept: ORTHOPEDICS | Facility: CLINIC | Age: 37
End: 2017-11-07
Payer: MEDICAID

## 2017-11-07 DIAGNOSIS — A49.01 STAPH AUREUS INFECTION: ICD-10-CM

## 2017-11-07 DIAGNOSIS — L03.011 CELLULITIS OF FINGER OF RIGHT HAND: Primary | ICD-10-CM

## 2017-11-07 PROCEDURE — 99212 OFFICE O/P EST SF 10 MIN: CPT | Mod: PBBFAC | Performed by: PHYSICIAN ASSISTANT

## 2017-11-07 PROCEDURE — 99999 PR PBB SHADOW E&M-EST. PATIENT-LVL II: CPT | Mod: PBBFAC,,, | Performed by: PHYSICIAN ASSISTANT

## 2017-11-07 PROCEDURE — 99024 POSTOP FOLLOW-UP VISIT: CPT | Mod: ,,, | Performed by: PHYSICIAN ASSISTANT

## 2017-11-08 NOTE — ADDENDUM NOTE
Addendum  created 11/08/17 1052 by Amaury Hardy Jr., MD    Anesthesia Intra Blocks edited, LDA updated via procedure documentation, Sign clinical note

## 2017-11-08 NOTE — PROGRESS NOTES
Mr. Mann is a 36-year-old male who presented with a dorsal hand abscess including the long finger. He had failed out patient antibiotic therapy. He was then admitted on 10/22/2017 for IV antibiotics and did not improve clinically. He was then treated with I and D on 10/24/2017.     He is here today for a post-operative visit after a    Irrigation and debridement of right dorsal hand abscess   including skin, subcutaneous tissue and muscle.  Area appeared less than 20   square cm.  by  on 10/24/2017.  MICRO: Methicillin resistant staphylococcus aureus: currently taking Bactrim DS which is sensitive ; projected end date of 11/08/2017    he reports that he is doing ok.  Pain is controlled.  he is taking pain medication.   He reports that he is  taking antibiotics as prescribed finished today.   He does report decreased sensation to tips of all fingers reports that this is returning.   he denies fever, chills, and sweats since the time of the surgery.     Physical exam: dressing taken down.  Incision is clean, dry and intact. No digns of infection Sutures removed without difficluty       Assessment:  Post-op visit ( 2 weeks)    Plan:  - The patient was advised to keep the incision clean and dry for the next 24 hours after which he may wash the area with antibacterial soap in the shower. Will not submerge until the incision is completely healed  - Contacted ID regarding follow up appointment,has one made for the 13 th  - will work on range of motion on his own. Declined OT/PT at this time will call if needed.   - pain medication: no refill needed,  - Patient is to return to clinic as needed will follow up with ID

## 2017-11-12 ENCOUNTER — NURSE TRIAGE (OUTPATIENT)
Dept: ADMINISTRATIVE | Facility: CLINIC | Age: 37
End: 2017-11-12

## 2017-11-12 NOTE — TELEPHONE ENCOUNTER
"  Reason for Disposition   [1] Caller requesting NON-URGENT health information AND [2] PCP's office is the best resource    Answer Assessment - Initial Assessment Questions  1. REASON FOR CALL or QUESTION: "What is your reason for calling today?" or "How can I best help you?" or "What question do you have that I can help answer?"    reschedule    Protocols used: ST INFORMATION ONLY CALL-A-AH    "

## 2017-11-22 ENCOUNTER — TELEPHONE (OUTPATIENT)
Dept: INFECTIOUS DISEASES | Facility: CLINIC | Age: 37
End: 2017-11-22

## 2017-11-22 ENCOUNTER — TELEPHONE (OUTPATIENT)
Dept: ORTHOPEDICS | Facility: CLINIC | Age: 37
End: 2017-11-22

## 2017-11-22 NOTE — TELEPHONE ENCOUNTER
Left a a voice mail on Samanta's phone explaining patient was 25 minutes late and the provider seeing the patient is on hospital service and could not see the patient. No other providers were available to see patient. No available appointments on Friday, either. Patient has been rescheduled to 12/15/17 until we are able to see if a provider can see the patient next week. Patient would not wait to speak to someone to explain to him.    Someone else called asking is the patient can be seen again due to it being a month after discharge for him to be seen today.She stated the patient told her the infection is worse in his finger. I spoke to one of the providers who was still here and they stated if the infection is worse the patient will need to go the the ER.

## 2017-11-22 NOTE — TELEPHONE ENCOUNTER
----- Message from Flor Díaz MA sent at 11/22/2017 11:32 AM CST -----  Pt stated that he came to appointment late. Pt really need to see someone today in ID, per ANGELICA Alvarez PA-C. Please call [phone] ASAP.  Thanks

## 2017-11-22 NOTE — TELEPHONE ENCOUNTER
Pt arrived at the orthopedic clinic today 11/22/17; an unscheduled apppointment. Per RR stated that the pt need to go to the ER. Regarding his infected finger right hand. Pt stated that he went to ID clinic for his schedule appointment,  and they turned him around. Pt was twenty minute late.  ID schedule another appointment for 11/30/17 at 2:30 pm. Pt stated that he have to go back to work. Pt denies fever or chills. Patient states verbal understanding and has no further questions.

## 2017-11-22 NOTE — TELEPHONE ENCOUNTER
----- Message from Brett Lunsford sent at 11/22/2017 12:09 PM CST -----  Contact: Patient  Patient called in requesting to speak with Dr. Anne Alvarez' Nurse ASAP. Please contact Patient at [phone]. Thank You

## 2017-11-28 LAB
FUNGUS SPEC CULT: NORMAL
FUNGUS SPEC CULT: NORMAL

## 2017-11-30 ENCOUNTER — OFFICE VISIT (OUTPATIENT)
Dept: INFECTIOUS DISEASES | Facility: CLINIC | Age: 37
End: 2017-11-30
Payer: MEDICAID

## 2017-11-30 VITALS
DIASTOLIC BLOOD PRESSURE: 72 MMHG | HEIGHT: 71 IN | HEART RATE: 89 BPM | BODY MASS INDEX: 21.64 KG/M2 | TEMPERATURE: 98 F | WEIGHT: 154.56 LBS | SYSTOLIC BLOOD PRESSURE: 133 MMHG

## 2017-11-30 DIAGNOSIS — A49.01 STAPH AUREUS INFECTION: ICD-10-CM

## 2017-11-30 DIAGNOSIS — L03.011 CELLULITIS OF FINGER OF RIGHT HAND: Primary | ICD-10-CM

## 2017-11-30 PROCEDURE — 99213 OFFICE O/P EST LOW 20 MIN: CPT | Mod: S$PBB,,, | Performed by: INTERNAL MEDICINE

## 2017-11-30 PROCEDURE — 99213 OFFICE O/P EST LOW 20 MIN: CPT | Mod: PBBFAC | Performed by: INTERNAL MEDICINE

## 2017-11-30 PROCEDURE — 99999 PR PBB SHADOW E&M-EST. PATIENT-LVL III: CPT | Mod: PBBFAC,,, | Performed by: INTERNAL MEDICINE

## 2017-11-30 RX ORDER — BUSPIRONE HYDROCHLORIDE 15 MG/1
TABLET ORAL
COMMUNITY
Start: 2017-10-25

## 2017-11-30 RX ORDER — OMEPRAZOLE 40 MG/1
CAPSULE, DELAYED RELEASE ORAL
COMMUNITY
Start: 2017-10-25

## 2017-11-30 RX ORDER — HYDROCORTISONE 25 MG/G
CREAM TOPICAL
COMMUNITY
Start: 2017-11-07

## 2017-11-30 RX ORDER — VALACYCLOVIR HYDROCHLORIDE 500 MG/1
TABLET, FILM COATED ORAL
COMMUNITY
Start: 2017-11-05

## 2017-11-30 RX ORDER — LORAZEPAM 2 MG/1
TABLET ORAL
COMMUNITY
Start: 2017-10-31

## 2017-11-30 RX ORDER — DEXTROAMPHETAMINE SACCHARATE, AMPHETAMINE ASPARTATE MONOHYDRATE, DEXTROAMPHETAMINE SULFATE AND AMPHETAMINE SULFATE 5; 5; 5; 5 MG/1; MG/1; MG/1; MG/1
CAPSULE, EXTENDED RELEASE ORAL DAILY
Refills: 0 | COMMUNITY
Start: 2017-08-30

## 2017-11-30 NOTE — PROGRESS NOTES
Subjective:      Patient ID: Ruddy Mann III is a 37 y.o. male.    Chief Complaint:   Follow-up for finger infection    History of Present Illness  37-year-old male presents for follow-up of right dorsal long finger cellulitis s/p I&D. In mid-October, patient had a cut on his finger after he was cleaning trees. He developed a pustule on his finger which subsequently led to hand swelling, redness, and pain. He initially went to OSH and received IV antibiotics, discharged on TMP-SMX. He presented to [facility] [place] as he had persistent hand swelling, redness, and pain. He underwent I&D - cultures with MRSA. Patient was discharged on TMP-SMX.  Patient has been on TMP-SMX for the last 6 weeks.    Patient presents given persistent redness over finger. Otherwise, patient denied any swelling, pain in his hand. Some sensitivity in his joints, but no pain with hand flexion and extension. Denied any fevers, chills, night sweats. Reports having poor appetite, stomach upset on the TMP-SMX.     Review of Systems   Constitution: Positive for decreased appetite and weight loss. Negative for chills, fever, weakness, malaise/fatigue, night sweats and weight gain.   HENT: Negative for congestion, ear pain, hearing loss, hoarse voice, sore throat and tinnitus.    Eyes: Negative for blurred vision, redness and visual disturbance.   Cardiovascular: Negative for chest pain, leg swelling and palpitations.   Respiratory: Negative for cough, hemoptysis, shortness of breath and sputum production.    Hematologic/Lymphatic: Negative for adenopathy. Does not bruise/bleed easily.   Skin: Negative for dry skin, itching, rash and suspicious lesions.   Musculoskeletal: Positive for joint pain. Negative for back pain, myalgias and neck pain.   Gastrointestinal: Positive for abdominal pain. Negative for constipation, diarrhea, heartburn, nausea and vomiting.   Genitourinary: Positive for urgency. Negative for dysuria, flank pain, frequency, hematuria and  "hesitancy.   Neurological: Positive for dizziness. Negative for headaches, numbness and paresthesias.   Psychiatric/Behavioral: Negative for depression and memory loss. The patient has insomnia and is nervous/anxious.      Objective:   Physical Exam   Constitutional: He is oriented to person, place, and time. He appears well-developed and well-nourished. No distress.   HENT:   Head: Normocephalic and atraumatic.   Eyes: Conjunctivae and EOM are normal.   Neck: Normal range of motion. Neck supple.   Cardiovascular: Normal rate.    Pulmonary/Chest: Effort normal. No respiratory distress.   Abdominal: Soft. He exhibits no distension.   Musculoskeletal: Normal range of motion. He exhibits no edema.   Right hand - third dorsal finger with redness, but no warmth, swelling. No pain with finger flexion and extension   Neurological: He is alert and oriented to person, place, and time.   Skin: Skin is warm and dry. No rash noted. He is not diaphoretic. No erythema.   Psychiatric: He has a normal mood and affect. His behavior is normal.   Vitals reviewed.             Significant labs reviewed:     Methicillin resistant staphylococcus aureus    CULTURE, AEROBIC  (SPECIFY SOURCE)    Clindamycin <=0.5 "><=0.5  Sensitive    Erythromycin >4  Resistant    Oxacillin >2  Resistant    Penicillin >8  Resistant    Tetracycline <=4 "><=4  Sensitive    Trimeth/Sulfa <=0.5/9.5 "><=0.5/9.5  Sensitive    Vancomycin 1  Sensitive       Significant imaging reviewed:  MR Brain 10/23/2017  Cellulitis as above most prevalent at the proximal 3rd digit with focal serpiginous area of non-enhancement suspected to represent early abscess/phlegmonous change versus tissue necrosis.    No well-defined drainable fluid collection.  No evidence of osteomyelitis.    Assessment:   37-year-old  - Third digit cellulitis / abscess  - s/p I&D 10/24/2917  - MRSA infection    Cellulitis has resolved - redness likely postinflammatory changes - no swelling and warmth. "     Plan:   - Discontinue TMP-SMX    Ginger Rogel MD MPH  Infectious Diseases [facility] [place]

## 2017-12-26 LAB
ACID FAST MOD KINY STN SPEC: NORMAL
ACID FAST MOD KINY STN SPEC: NORMAL
MYCOBACTERIUM SPEC QL CULT: NORMAL
MYCOBACTERIUM SPEC QL CULT: NORMAL

## 2018-10-18 ENCOUNTER — TELEPHONE (OUTPATIENT)
Dept: PSYCHIATRY | Facility: CLINIC | Age: 38
End: 2018-10-18

## 2018-10-18 NOTE — TELEPHONE ENCOUNTER
Spoke to patient by phone in response to VM. Patient is looking for services for PTSD treatment. Discussed my limitations in patient population. Possible alternative resources given to him and I will call him if additional therapists with proficiency in PTSD treatment who accept Medicaid are identified.  KEMI Cage, PhD

## 2024-03-03 PROCEDURE — 99284 EMERGENCY DEPT VISIT MOD MDM: CPT | Mod: 25

## 2024-03-04 ENCOUNTER — HOSPITAL ENCOUNTER (EMERGENCY)
Facility: HOSPITAL | Age: 44
Discharge: HOME | End: 2024-03-04
Attending: SURGERY
Payer: MEDICAID

## 2024-03-04 VITALS
HEIGHT: 71 IN | DIASTOLIC BLOOD PRESSURE: 62 MMHG | TEMPERATURE: 99 F | WEIGHT: 167.25 LBS | BODY MASS INDEX: 23.41 KG/M2 | SYSTOLIC BLOOD PRESSURE: 116 MMHG | OXYGEN SATURATION: 97 % | RESPIRATION RATE: 18 BRPM | HEART RATE: 76 BPM

## 2024-03-04 DIAGNOSIS — S61.432A PUNCTURE WOUND OF LEFT HAND, FOREIGN BODY PRESENCE UNSPECIFIED, INITIAL ENCOUNTER: Primary | ICD-10-CM

## 2024-03-04 DIAGNOSIS — M25.532 LEFT WRIST PAIN: ICD-10-CM

## 2024-03-04 PROCEDURE — 25000003 PHARM REV CODE 250: Performed by: SURGERY

## 2024-03-04 PROCEDURE — S0039 INJECTION, SULFAMETHOXAZOLE: HCPCS | Performed by: SURGERY

## 2024-03-04 PROCEDURE — 96375 TX/PRO/DX INJ NEW DRUG ADDON: CPT

## 2024-03-04 PROCEDURE — 63600175 PHARM REV CODE 636 W HCPCS: Performed by: SURGERY

## 2024-03-04 PROCEDURE — 96365 THER/PROPH/DIAG IV INF INIT: CPT

## 2024-03-04 RX ORDER — SULFAMETHOXAZOLE AND TRIMETHOPRIM 800; 160 MG/1; MG/1
1 TABLET ORAL 2 TIMES DAILY
Qty: 14 TABLET | Refills: 0 | Status: SHIPPED | OUTPATIENT
Start: 2024-03-04 | End: 2024-03-11

## 2024-03-04 RX ORDER — MELOXICAM 7.5 MG/1
7.5 TABLET ORAL DAILY
Qty: 10 TABLET | Refills: 0 | Status: SHIPPED | OUTPATIENT
Start: 2024-03-04 | End: 2024-03-14

## 2024-03-04 RX ORDER — MUPIROCIN 20 MG/G
OINTMENT TOPICAL 3 TIMES DAILY
Qty: 15 G | Refills: 0 | Status: SHIPPED | OUTPATIENT
Start: 2024-03-04 | End: 2024-03-14

## 2024-03-04 RX ORDER — KETOROLAC TROMETHAMINE 30 MG/ML
30 INJECTION, SOLUTION INTRAMUSCULAR; INTRAVENOUS
Status: COMPLETED | OUTPATIENT
Start: 2024-03-04 | End: 2024-03-04

## 2024-03-04 RX ADMIN — SULFAMETHOXAZOLE AND TRIMETHOPRIM 379.52 MG: 80; 16 INJECTION, SOLUTION, CONCENTRATE INTRAVENOUS at 01:03

## 2024-03-04 RX ADMIN — KETOROLAC TROMETHAMINE 30 MG: 30 INJECTION, SOLUTION INTRAMUSCULAR at 12:03

## 2024-03-04 NOTE — ED PROVIDER NOTES
Encounter Date: 3/3/2024       History     Chief Complaint   Patient presents with    Hand Injury     PT to ED with c/o left hand injury while using a drill.      History of Present Illness  Ruddy Mann III is a 43 y.o. male that presents with a left hand injury  Patient was working on a [employer] pit with a drill per ER interview this evening  Patient accidentally drilled into left thenar eminence, 1st webspace this evening  Patient presents with pain in the left 1st webspace in the thenar eminence now  Active bleeding, no obvious foreign body, immediately soaked with Betadine  Immediately irrigated with Betadine, patient is neurovascular intact on exam    The history is provided by the patient.     Review of patient's allergies indicates:  No Known Allergies  History reviewed. No pertinent past medical history.  History reviewed. No pertinent surgical history.  Family History   Problem Relation Age of Onset    No Known Problems Mother     No Known Problems Father      Social History     Tobacco Use    Smoking status: Never    Smokeless tobacco: Never   Substance Use Topics    Alcohol use: No    Drug use: No     Review of Systems   Constitutional:  Negative for activity change, appetite change, fatigue, fever and unexpected weight change.   HENT:  Negative for congestion, ear pain, mouth sores, nosebleeds, rhinorrhea, sinus pressure, sneezing and sore throat.    Eyes:  Negative for pain, discharge, redness and itching.   Respiratory:  Negative for apnea, cough, chest tightness and shortness of breath.    Cardiovascular:  Negative for chest pain, palpitations and leg swelling.   Gastrointestinal:  Negative for abdominal distention, abdominal pain, anal bleeding, constipation, diarrhea, nausea and vomiting.   Endocrine: Negative.    Genitourinary:  Negative for dysuria, enuresis, flank pain and frequency.   Musculoskeletal:  Negative for arthralgias, back pain, neck pain and neck stiffness.   Skin:  Positive  for wound. Negative for color change.   Allergic/Immunologic: Negative.    Neurological:  Negative for dizziness, tremors, syncope, facial asymmetry, speech difficulty, weakness, light-headedness, numbness and headaches.   Hematological:  Negative for adenopathy. Does not bruise/bleed easily.   Psychiatric/Behavioral:  Negative for agitation, behavioral problems, hallucinations, self-injury and suicidal ideas. The patient is not nervous/anxious.        Physical Exam     Initial Vitals [03/04/24 0002]   BP Pulse Resp Temp SpO2   (!) 144/82 85 18 98.9 °F (37.2 °C) 98 %      MAP       --         Physical Exam    Nursing note and vitals reviewed.  Constitutional: Vital signs are normal. He appears well-developed and well-nourished. He is cooperative.   HENT:   Head: Normocephalic and atraumatic.   Mouth/Throat: Uvula is midline and mucous membranes are normal.   Eyes: Conjunctivae, EOM and lids are normal. Pupils are equal, round, and reactive to light.   Neck: Neck supple.   Normal range of motion.   Full passive range of motion without pain.     Cardiovascular:  Normal rate, regular rhythm, S1 normal, S2 normal, normal heart sounds, intact distal pulses and normal pulses.           Pulmonary/Chest: Effort normal and breath sounds normal.   Abdominal: Abdomen is soft and flat. Bowel sounds are normal.   Musculoskeletal:      Cervical back: Full passive range of motion without pain, normal range of motion and neck supple.      Comments: Pain & minor soft tissue swelling in the area of the left thenar eminence     Neurological: He is alert and oriented to person, place, and time. He has normal strength.   Skin: Skin is intact. Capillary refill takes less than 2 seconds.   (+) puncture wound in the ER left thenar eminence         ED Course   Procedures  Labs Reviewed - No data to display       Imaging Results              X-Ray Hand 3 view Left (In process)                      X-Ray Wrist Complete Left (In process)                       Medications   sulfamethoxazole-trimethoprim 400-80 mg/5 mL (BACTRIM) 379.52 mg in dextrose 5 % (D5W) 593 mL IVPB (has no administration in time range)   ketorolac injection 30 mg (has no administration in time range)   Tdap (BOOSTRIX) vaccine injection 0.5 mL (has no administration in time range)     Medical Decision Making  Puncture wound with a drove to left thenar eminence on clinical evaluation  Differential includes puncture wound, foreign body, fracture, soft tissue injury    Problems Addressed:  Left wrist pain: complicated acute illness or injury  Puncture wound of left hand, foreign body presence unspecified, initial encounter: complicated acute illness or injury    Amount and/or Complexity of Data Reviewed  Radiology: ordered and independent interpretation performed.    ED Management & Risks of Complication, Morbidity, & Mortality:  X-ray of the left hand & left wrist showed no acute findings in the ER  X-ray left hand shows no foreign body, wound irrigated with Betadine  Wound irrigated with Betadine sterile water in the emergency room   IV Bactrim given with a prescription of Bactrim on discharge tonight  Ambulatory referral to hand surgery to monitor outpatient until healed  Patient counseled extensively on the importance of outpatient follow-up    Need for Hospitalization or Surgery with Social Determinants of Health:  This patient does not need to be hospitalized on ER evaluation today  The patient's diagnosis is not limited by social determinants of health  Does not require surgery or procedure (major or minor), no risk factors    Clinical Impression:  Final diagnoses:  [M25.532] Left wrist pain  [S61.432A] Puncture wound of left hand, foreign body presence unspecified, initial encounter (Primary)          ED Disposition Condition    Discharge Stable          ED Prescriptions       Medication Sig Dispense Start Date End Date Auth. Provider    mupirocin (BACTROBAN) 2 % ointment Apply  topically 3 (three) times daily. for 10 days 15 g 3/4/2024 3/14/2024 Chris Nicholson MD    sulfamethoxazole-trimethoprim 800-160mg (BACTRIM DS) 800-160 mg Tab Take 1 tablet by mouth 2 (two) times daily. for 7 days 14 tablet 3/4/2024 3/11/2024 Chris Nicholson MD          Follow-up Information       Follow up With Specialties Details Why Contact Info    Bandar Brooks IV, MD Orthopedic Surgery Go in 2 days  [address]  [address]  [facility] [address]  [phone]               Chris Nicholson MD  03/04/24 0030

## (undated) DEVICE — SEE MEDLINE ITEM 152622

## (undated) DEVICE — DRAPE PLASTIC U 60X72

## (undated) DEVICE — PAD CAST 2 IN X 4YDS STERILE

## (undated) DEVICE — DRESSING N ADH OIL EMUL 3X8 3S

## (undated) DEVICE — GAUZE SPONGE 4X4 12PLY

## (undated) DEVICE — SEE MEDLINE ITEM 157150

## (undated) DEVICE — BANDAGE ELASTIC ACE 2IN 10/CA

## (undated) DEVICE — SHEET DRAPE FAN-FOLDED 3/4

## (undated) DEVICE — SEE MEDLINE ITEM 157173

## (undated) DEVICE — DRAPE STERI U-SHAPED 47X51IN

## (undated) DEVICE — SEE MEDLINE ITEM 146347

## (undated) DEVICE — SPONGE LAP 18X18 PREWASHED

## (undated) DEVICE — BLADE SURG CARBON STEEL #10

## (undated) DEVICE — ELECTRODE REM PLYHSV RETURN 9

## (undated) DEVICE — TAPE SURG DURAPORE 2 X10YD

## (undated) DEVICE — TRAY MINOR ORTHO

## (undated) DEVICE — DRAPE STERI-DRAPE 1000 17X11IN

## (undated) DEVICE — SET TUR BLADDER IRRIG Y TYPE

## (undated) DEVICE — GAUZE SPONGE 4'X4 12 PLY

## (undated) DEVICE — DRAPE STERI INSTRUMENT 1018

## (undated) DEVICE — SEE MEDLINE ITEM 146417